# Patient Record
Sex: MALE | Race: WHITE | Employment: OTHER | URBAN - METROPOLITAN AREA
[De-identification: names, ages, dates, MRNs, and addresses within clinical notes are randomized per-mention and may not be internally consistent; named-entity substitution may affect disease eponyms.]

---

## 2019-03-27 ENCOUNTER — HOSPITAL ENCOUNTER (OUTPATIENT)
Dept: INTERVENTIONAL RADIOLOGY/VASCULAR | Age: 84
Discharge: NURSING, REHAB OR LONG-TERM CARE | End: 2019-03-27
Attending: UROLOGY | Admitting: UROLOGY

## 2019-03-27 DIAGNOSIS — R33.9 URINARY RETENTION: ICD-10-CM

## 2019-03-27 RX ORDER — LIDOCAINE HYDROCHLORIDE 10 MG/ML
30 INJECTION, SOLUTION EPIDURAL; INFILTRATION; INTRACAUDAL; PERINEURAL ONCE
Status: DISCONTINUED | OUTPATIENT
Start: 2019-03-27 | End: 2019-03-27 | Stop reason: HOSPADM

## 2019-03-27 RX ORDER — LIDOCAINE HYDROCHLORIDE 20 MG/ML
20 INJECTION, SOLUTION INFILTRATION; PERINEURAL ONCE
Status: DISCONTINUED | OUTPATIENT
Start: 2019-03-27 | End: 2019-03-27

## 2019-03-27 NOTE — PROGRESS NOTES
Patient transferred to IR via stretcher for supra pubic tube placement from [facility] and accompanied by daughter, Paula Curtis. (home # [phone] or cell# [phone])     Initial inquiry reveals patient had some bites of shannon and waffle this am and Xarelto given last evening at 6 pm. This was verified with nurse Jose Schwab at [facility]. Procedure rescheduled for 4/10/19 at 9 am. Spoke with nurse from [facility] and reviewed preprocedure instructions: NPO after midnight on 4/9/19 and hold Xarelto on 4/9/19 if okay with PCP. Also requested patient arrive in hospital gown morning of procedure. Informed daughter of plan.

## 2019-04-10 ENCOUNTER — HOSPITAL ENCOUNTER (OUTPATIENT)
Dept: INTERVENTIONAL RADIOLOGY/VASCULAR | Age: 84
Discharge: HOME | End: 2019-04-10
Attending: UROLOGY | Admitting: RADIOLOGY
Payer: MEDICARE

## 2019-04-10 VITALS
HEART RATE: 80 BPM | DIASTOLIC BLOOD PRESSURE: 71 MMHG | HEIGHT: 74 IN | WEIGHT: 189 LBS | SYSTOLIC BLOOD PRESSURE: 142 MMHG | TEMPERATURE: 97.8 F | BODY MASS INDEX: 24.26 KG/M2 | OXYGEN SATURATION: 94 % | RESPIRATION RATE: 14 BRPM

## 2019-04-10 PROCEDURE — 74011250636 HC RX REV CODE- 250/636: Performed by: RADIOLOGY

## 2019-04-10 PROCEDURE — 77030005518 HC CATH URETH FOL 2W BARD -B

## 2019-04-10 PROCEDURE — 99152 MOD SED SAME PHYS/QHP 5/>YRS: CPT

## 2019-04-10 PROCEDURE — C1769 GUIDE WIRE: HCPCS

## 2019-04-10 PROCEDURE — 74011636320 HC RX REV CODE- 636/320: Performed by: RADIOLOGY

## 2019-04-10 PROCEDURE — C1892 INTRO/SHEATH,FIXED,PEEL-AWAY: HCPCS

## 2019-04-10 PROCEDURE — C1894 INTRO/SHEATH, NON-LASER: HCPCS

## 2019-04-10 PROCEDURE — 77030010545

## 2019-04-10 RX ORDER — LIDOCAINE HYDROCHLORIDE 10 MG/ML
INJECTION, SOLUTION EPIDURAL; INFILTRATION; INTRACAUDAL; PERINEURAL
Status: DISCONTINUED
Start: 2019-04-10 | End: 2019-04-10 | Stop reason: HOSPADM

## 2019-04-10 RX ORDER — LIDOCAINE HYDROCHLORIDE 20 MG/ML
JELLY TOPICAL AS NEEDED
Status: DISCONTINUED | OUTPATIENT
Start: 2019-04-10 | End: 2019-04-10 | Stop reason: HOSPADM

## 2019-04-10 RX ORDER — LEVOFLOXACIN 5 MG/ML
500 INJECTION, SOLUTION INTRAVENOUS
Status: COMPLETED | OUTPATIENT
Start: 2019-04-10 | End: 2019-04-10

## 2019-04-10 RX ORDER — FENTANYL CITRATE 50 UG/ML
25 INJECTION, SOLUTION INTRAMUSCULAR; INTRAVENOUS
Status: DISCONTINUED | OUTPATIENT
Start: 2019-04-10 | End: 2019-04-10

## 2019-04-10 RX ORDER — MIDAZOLAM HYDROCHLORIDE 1 MG/ML
5 INJECTION, SOLUTION INTRAMUSCULAR; INTRAVENOUS
Status: DISCONTINUED | OUTPATIENT
Start: 2019-04-10 | End: 2019-04-10

## 2019-04-10 RX ORDER — SODIUM CHLORIDE 9 MG/ML
25 INJECTION, SOLUTION INTRAVENOUS ONCE
Status: COMPLETED | OUTPATIENT
Start: 2019-04-10 | End: 2019-04-10

## 2019-04-10 RX ORDER — LIDOCAINE HYDROCHLORIDE 10 MG/ML
30 INJECTION, SOLUTION EPIDURAL; INFILTRATION; INTRACAUDAL; PERINEURAL ONCE
Status: COMPLETED | OUTPATIENT
Start: 2019-04-10 | End: 2019-04-10

## 2019-04-10 RX ADMIN — FENTANYL CITRATE 25 MCG: 50 INJECTION, SOLUTION INTRAMUSCULAR; INTRAVENOUS at 12:39

## 2019-04-10 RX ADMIN — MIDAZOLAM HYDROCHLORIDE 0.5 MG: 1 INJECTION, SOLUTION INTRAMUSCULAR; INTRAVENOUS at 12:39

## 2019-04-10 RX ADMIN — FENTANYL CITRATE 12.5 MCG: 50 INJECTION, SOLUTION INTRAMUSCULAR; INTRAVENOUS at 12:27

## 2019-04-10 RX ADMIN — LIDOCAINE HYDROCHLORIDE 12 ML: 10 INJECTION, SOLUTION EPIDURAL; INFILTRATION; INTRACAUDAL; PERINEURAL at 12:37

## 2019-04-10 RX ADMIN — MIDAZOLAM HYDROCHLORIDE 0.5 MG: 1 INJECTION, SOLUTION INTRAMUSCULAR; INTRAVENOUS at 12:26

## 2019-04-10 RX ADMIN — IOPAMIDOL 15 ML: 612 INJECTION, SOLUTION INTRAVENOUS at 12:45

## 2019-04-10 RX ADMIN — FENTANYL CITRATE 12.5 MCG: 50 INJECTION, SOLUTION INTRAMUSCULAR; INTRAVENOUS at 12:20

## 2019-04-10 RX ADMIN — SODIUM CHLORIDE 25 ML/HR: 900 INJECTION, SOLUTION INTRAVENOUS at 11:07

## 2019-04-10 RX ADMIN — LEVOFLOXACIN 500 MG: 5 INJECTION, SOLUTION INTRAVENOUS at 11:50

## 2019-04-10 NOTE — PROGRESS NOTES
Report called to [facility]  [phone] regarding placement of suprapubic catheter and post procedure care. Reviewed medications given, VS, drain site and post orders. Verbalized understanding of same. Awaiting transport. Sister at bedside.

## 2019-04-10 NOTE — DISCHARGE INSTRUCTIONS
Side effects of sedation medications and other medications used today have been reviewed. Notify us of nausea, itching, hives, dizziness, or any unusual symptoms. Should you experience any of these significant changes, please call [phone] between the hours of 7:30 am and 10 pm or [phone] after hours. After hours, ask the  to page the X-ray Technologist, and describe the problem to the technologist.     [address]  Special Procedure/Radiology Department      Radiologist: Radha Romero MD    Date: April 10, 2019    Suprapubic catheter Discharge Instructions    Someone should stay with you for the next 24 hours because you did receive sedation. You may be more unsteady than you usual because of the sedation. Have assistance when getting up. Increase your oral fluid intake for the next 24 hours to flush the contrast from you system. Resume your previous diet and medications you were taking prior to today's visit. Do not drive a vehicle or sign any legal documents for the next 24 hours. Watch for signs of infection:  redness, swelling, drainage, pus, fever, chills, or vomiting please call your urologist right away. Use large drainage bag at night and leg bag during the day. Change the dressing every 48 hours for the first week post insertion and then as directed by urologist.    Keep dressing clean and dry, do not get it wet. The dressing needs to be changed every    You may be sore for the next day or two. You make take Tylenol as directed on the label, for soreness. If you have increasing pain over the next few days, please call your urologist or Dr. Lazara Norman, your primary care physician. Follow up with your urologist, as directed. Please do not hesitate to call our department with any questions or concerns at the number listed above.     Jose L Cortes RN        Copy to daughter, Ann Marie Glass and [facility]

## 2019-04-10 NOTE — PROGRESS NOTES
Suprapubic catheter placed by Dr. Melody Boothe. DDI mid lower abd. Draining pale pink urine with sediment. Drowsy but awakens easily. VSS.

## 2019-04-10 NOTE — PROGRESS NOTES
Rcvd. Via stretcher with [employer] and daughter, Climmie Quails prior to supra pubic catheter placement. Procedure and plan of care explained to patient and daughter. Dr. Adi Campbell at bedside. Xarelto last taken on Monday Monday 4-8-19 per daughter and patient. No labs available. Dr. Joshi Postin aware of same.

## 2019-04-10 NOTE — PROGRESS NOTES
Transport present to return patient to [facility].   Daughter riding with patient and transport team.

## 2019-05-04 ENCOUNTER — HOSPITAL ENCOUNTER (EMERGENCY)
Age: 84
Discharge: HOME | End: 2019-05-04
Attending: EMERGENCY MEDICINE
Payer: MEDICARE

## 2019-05-04 VITALS
OXYGEN SATURATION: 97 % | BODY MASS INDEX: 24.66 KG/M2 | DIASTOLIC BLOOD PRESSURE: 66 MMHG | WEIGHT: 182.1 LBS | HEIGHT: 72 IN | HEART RATE: 83 BPM | TEMPERATURE: 98 F | RESPIRATION RATE: 20 BRPM | SYSTOLIC BLOOD PRESSURE: 135 MMHG

## 2019-05-04 DIAGNOSIS — R33.9 URINARY RETENTION: Primary | ICD-10-CM

## 2019-05-04 DIAGNOSIS — N30.00 ACUTE CYSTITIS WITHOUT HEMATURIA: ICD-10-CM

## 2019-05-04 DIAGNOSIS — E86.0 DEHYDRATION: ICD-10-CM

## 2019-05-04 LAB
ANION GAP SERPL CALC-SCNC: 6 MMOL/L (ref 5–15)
APPEARANCE UR: ABNORMAL
BACTERIA URNS QL MICRO: ABNORMAL /HPF
BASOPHILS # BLD: 0 K/UL (ref 0–0.1)
BASOPHILS NFR BLD: 0 % (ref 0–1)
BILIRUB UR QL: NEGATIVE
BUN SERPL-MCNC: 28 MG/DL (ref 6–20)
BUN/CREAT SERPL: 32 (ref 12–20)
CALCIUM SERPL-MCNC: 9 MG/DL (ref 8.5–10.1)
CHLORIDE SERPL-SCNC: 110 MMOL/L (ref 97–108)
CO2 SERPL-SCNC: 27 MMOL/L (ref 21–32)
COLOR UR: ABNORMAL
COMMENT, HOLDF: NORMAL
CREAT SERPL-MCNC: 0.87 MG/DL (ref 0.7–1.3)
DIFFERENTIAL METHOD BLD: ABNORMAL
EOSINOPHIL # BLD: 0.5 K/UL (ref 0–0.4)
EOSINOPHIL NFR BLD: 4 % (ref 0–7)
EPITH CASTS URNS QL MICRO: ABNORMAL /LPF
ERYTHROCYTE [DISTWIDTH] IN BLOOD BY AUTOMATED COUNT: 17 % (ref 11.5–14.5)
GLUCOSE SERPL-MCNC: 98 MG/DL (ref 65–100)
GLUCOSE UR STRIP.AUTO-MCNC: NEGATIVE MG/DL
GRAM STN SPEC: NORMAL
HCT VFR BLD AUTO: 41 % (ref 36.6–50.3)
HGB BLD-MCNC: 12.8 G/DL (ref 12.1–17)
HGB UR QL STRIP: ABNORMAL
IMM GRANULOCYTES # BLD AUTO: 0.1 K/UL (ref 0–0.04)
IMM GRANULOCYTES NFR BLD AUTO: 1 % (ref 0–0.5)
KETONES UR QL STRIP.AUTO: NEGATIVE MG/DL
LEUKOCYTE ESTERASE UR QL STRIP.AUTO: ABNORMAL
LYMPHOCYTES # BLD: 2.1 K/UL (ref 0.8–3.5)
LYMPHOCYTES NFR BLD: 20 % (ref 12–49)
MCH RBC QN AUTO: 29.4 PG (ref 26–34)
MCHC RBC AUTO-ENTMCNC: 31.2 G/DL (ref 30–36.5)
MCV RBC AUTO: 94.3 FL (ref 80–99)
MONOCYTES # BLD: 0.7 K/UL (ref 0–1)
MONOCYTES NFR BLD: 7 % (ref 5–13)
NEUTS SEG # BLD: 7.3 K/UL (ref 1.8–8)
NEUTS SEG NFR BLD: 68 % (ref 32–75)
NITRITE UR QL STRIP.AUTO: NEGATIVE
NRBC # BLD: 0 K/UL (ref 0–0.01)
NRBC BLD-RTO: 0 PER 100 WBC
PH UR STRIP: 7 [PH] (ref 5–8)
PLATELET # BLD AUTO: 364 K/UL (ref 150–400)
PMV BLD AUTO: 9.4 FL (ref 8.9–12.9)
POTASSIUM SERPL-SCNC: 3.8 MMOL/L (ref 3.5–5.1)
PROT UR STRIP-MCNC: 100 MG/DL
RBC # BLD AUTO: 4.35 M/UL (ref 4.1–5.7)
RBC #/AREA URNS HPF: ABNORMAL /HPF (ref 0–5)
SAMPLES BEING HELD,HOLD: NORMAL
SERVICE CMNT-IMP: NORMAL
SODIUM SERPL-SCNC: 143 MMOL/L (ref 136–145)
SP GR UR REFRACTOMETRY: 1.02 (ref 1–1.03)
UR CULT HOLD, URHOLD: NORMAL
UROBILINOGEN UR QL STRIP.AUTO: 1 EU/DL (ref 0.2–1)
WBC # BLD AUTO: 10.7 K/UL (ref 4.1–11.1)
WBC URNS QL MICRO: >100 /HPF (ref 0–4)

## 2019-05-04 PROCEDURE — 74011250636 HC RX REV CODE- 250/636: Performed by: EMERGENCY MEDICINE

## 2019-05-04 PROCEDURE — 74011250637 HC RX REV CODE- 250/637: Performed by: EMERGENCY MEDICINE

## 2019-05-04 PROCEDURE — 81001 URINALYSIS AUTO W/SCOPE: CPT

## 2019-05-04 PROCEDURE — 74011000258 HC RX REV CODE- 258: Performed by: EMERGENCY MEDICINE

## 2019-05-04 PROCEDURE — 87086 URINE CULTURE/COLONY COUNT: CPT

## 2019-05-04 PROCEDURE — 99285 EMERGENCY DEPT VISIT HI MDM: CPT

## 2019-05-04 PROCEDURE — 77030034849

## 2019-05-04 PROCEDURE — 87205 SMEAR GRAM STAIN: CPT

## 2019-05-04 PROCEDURE — 80048 BASIC METABOLIC PNL TOTAL CA: CPT

## 2019-05-04 PROCEDURE — 87186 SC STD MICRODIL/AGAR DIL: CPT

## 2019-05-04 PROCEDURE — 96365 THER/PROPH/DIAG IV INF INIT: CPT

## 2019-05-04 PROCEDURE — 51705 CHANGE OF BLADDER TUBE: CPT

## 2019-05-04 PROCEDURE — 85025 COMPLETE CBC W/AUTO DIFF WBC: CPT

## 2019-05-04 PROCEDURE — 36415 COLL VENOUS BLD VENIPUNCTURE: CPT

## 2019-05-04 PROCEDURE — 87077 CULTURE AEROBIC IDENTIFY: CPT

## 2019-05-04 PROCEDURE — 96366 THER/PROPH/DIAG IV INF ADDON: CPT

## 2019-05-04 RX ORDER — CEFDINIR 300 MG/1
300 CAPSULE ORAL
Status: COMPLETED | OUTPATIENT
Start: 2019-05-04 | End: 2019-05-04

## 2019-05-04 RX ORDER — CEFDINIR 300 MG/1
300 CAPSULE ORAL 2 TIMES DAILY
Qty: 14 CAP | Refills: 0 | Status: SHIPPED | OUTPATIENT
Start: 2019-05-04 | End: 2019-05-11

## 2019-05-04 RX ADMIN — PIPERACILLIN SODIUM,TAZOBACTAM SODIUM 3.38 G: 3; .375 INJECTION, POWDER, FOR SOLUTION INTRAVENOUS at 09:52

## 2019-05-04 RX ADMIN — CEFDINIR 300 MG: 300 CAPSULE ORAL at 11:54

## 2019-05-04 RX ADMIN — SODIUM CHLORIDE 1000 ML: 900 INJECTION, SOLUTION INTRAVENOUS at 09:50

## 2019-05-04 NOTE — ED TRIAGE NOTES
Triage Note: Patient is coming in from [facility] with a clogged yee and abdominal pain. Patient states that the yee was supposed to be changed 2 days ago but did not change the yee. Patient had low oxygen levels at the home and placed on 2 LNC en route but does not usually wear oxygen.

## 2019-05-04 NOTE — DISCHARGE INSTRUCTIONS
Patient Education   Patient Education        Urinary Tract Infections in Men: Care Instructions  Your Care Instructions    A urinary tract infection, or UTI, is a general term for an infection anywhere between the kidneys and the tip of the penis. UTIs can also be a result of a prostate problem. Most cause pain or burning when you urinate. Most UTIs are caused by bacteria and can be cured with antibiotics. It is important to complete your treatment so that the infection does not get worse. Follow-up care is a key part of your treatment and safety. Be sure to make and go to all appointments, and call your doctor if you are having problems. It's also a good idea to know your test results and keep a list of the medicines you take. How can you care for yourself at home? · Take your antibiotics as prescribed. Do not stop taking them just because you feel better. You need to take the full course of antibiotics. · Take your medicines exactly as prescribed. Your doctor may have prescribed a medicine, such as phenazopyridine (Pyridium), to help relieve pain when you urinate. This turns your urine orange. You may stop taking it when your symptoms get better. But be sure to take all of your antibiotics, which treat the infection. · Drink extra water for the next day or two. This will help make the urine less concentrated and help wash out the bacteria causing the infection. (If you have kidney, heart, or liver disease and have to limit your fluids, talk with your doctor before you increase your fluid intake.)  · Avoid drinks that are carbonated or have caffeine. They can irritate the bladder. · Urinate often. Try to empty your bladder each time. · To relieve pain, take a hot bath or lay a heating pad (set on low) over your lower belly or genital area. Never go to sleep with a heating pad in place. To help prevent UTIs  · Drink plenty of fluids, enough so that your urine is light yellow or clear like water.  If you have kidney, heart, or liver disease and have to limit fluids, talk with your doctor before you increase the amount of fluids you drink. · Urinate when you have the urge. Do not hold your urine for a long time. Urinate before you go to sleep. · Keep your penis clean. Catheter care  If you have a drainage tube (catheter) in place, the following steps will help you care for it. · Always wash your hands before and after touching your catheter. · Check the area around the urethra for inflammation or signs of infection. Signs of infection include irritated, swollen, red, or tender skin, or pus around the catheter. · Clean the area around the catheter with soap and water two times a day. Dry with a clean towel afterward. · Do not apply powder or lotion to the skin around the catheter. To empty the urine collection bag  · Wash your hands with soap and water. · Without touching the drain spout, remove the spout from its sleeve at the bottom of the collection bag. Open the valve on the spout. · Let the urine flow out of the bag and into the toilet or a container. Do not let the tubing or drain spout touch anything. · After you empty the bag, clean the end of the drain spout with tissue and water. Close the valve and put the drain spout back into its sleeve at the bottom of the collection bag. · Wash your hands with soap and water. When should you call for help? Call your doctor now or seek immediate medical care if:    · Symptoms such as a fever, chills, nausea, or vomiting get worse or happen for the first time.     · You have new pain in your back just below your rib cage. This is called flank pain.     · There is new blood or pus in your urine.     · You are not able to take or keep down your antibiotics.    Watch closely for changes in your health, and be sure to contact your doctor if:    · You are not getting better after taking an antibiotic for 2 days.     · Your symptoms go away but then come back.    Where can you learn more? Go to http://jey-crissy.info/. Enter G945 in the search box to learn more about \"Urinary Tract Infections in Men: Care Instructions. \"  Current as of: March 20, 2018  Content Version: 11.9  © 1247-5288 [employer]. Care instructions adapted under license by [employer] (which disclaims liability or warranty for this information). If you have questions about a medical condition or this instruction, always ask your healthcare professional. [address] any warranty or liability for your use of this information. Urinary Retention: Care Instructions  Your Care Instructions    Urinary retention means that you aren't able to urinate. In men, it is often caused by a blockage of the urinary tract from an enlarged prostate gland. In men and women, it can also be caused by an infection or nerve damage. Or it may be a side effect of a medicine. The doctor may have drained the urine from your bladder. If you still have problems passing urine, you may need to use a catheter at home. This is used to empty your bladder until the problem can be fixed. Your doctor may put a catheter in your bladder before you go home. If so, he or she will tell you when to come back to have the catheter removed. The doctor has checked you closely. But problems can develop later. If you notice any problems or new symptoms, get medical treatment right away. Follow-up care is a key part of your treatment and safety. Be sure to make and go to all appointments, and call your doctor if you are having problems. It's also a good idea to know your test results and keep a list of the medicines you take. How can you care for yourself at home? · Take your medicines exactly as prescribed. Call your doctor if you think you are having a problem with your medicine. You will get more details on the specific medicines your doctor prescribes.   · Check with your doctor before you use any over-the-counter medicines. Many cold and allergy medicines, for example, can make this problem worse. Make sure your doctor knows all of the medicines, vitamins, supplements, and herbal remedies you take. · Spread out through the day the amount of fluid you drink. Do not drink a lot at bedtime. · Avoid alcohol and caffeine. · If you have been given a catheter, or if one is already in place, follow the instructions you were given. Always wash your hands before and after you handle the catheter. When should you call for help? Call your doctor now or seek immediate medical care if:    · You cannot urinate at all, or it is getting harder to urinate.     · You have symptoms of a urinary tract infection. These may include:  ? Pain or burning when you urinate. ? A frequent need to urinate without being able to pass much urine. ? Pain in the flank, which is just below the rib cage and above the waist on either side of the back. ? Blood in your urine. ? A fever.    Watch closely for changes in your health, and be sure to contact your doctor if:    · You have any problems with your catheter.     · You do not get better as expected. Where can you learn more? Go to http://jey-crissy.info/. Enter M244 in the search box to learn more about \"Urinary Retention: Care Instructions. \"  Current as of: March 20, 2018  Content Version: 11.9  © 6663-9536 [employer]. Care instructions adapted under license by [employer] (which disclaims liability or warranty for this information). If you have questions about a medical condition or this instruction, always ask your healthcare professional. [place] [address] any warranty or liability for your use of this information. Patient Education        Learning About Urinary Catheter Care to Prevent Infection  What is a urinary catheter?     A urinary catheter is a flexible plastic tube used to drain urine from your bladder when you can't urinate on your own. The catheter allows urine to drain from the bladder into a bag. Two types of drainage bags may be used with a urinary catheter. · A bedside bag is a large bag that you can hang on the side of your bed or on a chair. You can use it overnight or anytime you will be sitting or lying down for a long time. · A leg bag is a small bag that you can use during the day. It is usually attached to your thigh or calf and hidden under your clothes. Having a urinary catheter increases your risk of getting a urinary tract infection. Germs may get on the catheter and cause an infection in your bladder or kidneys. The longer you have a catheter, the more likely it is that you will get an infection. You can help prevent this problem with good hygiene and careful handling of your catheter and drainage bags. How can you help prevent infection? Take care to be clean  · Always wash your hands well before and after you handle your catheter. · Clean the skin around the catheter twice a day using soap and water. Dry with a clean towel afterward. You can shower with your catheter and drainage bag in place unless your doctor told you not to. · When you clean around the catheter, check the surrounding skin for signs of infection. Look for things like pus or irritated, swollen, red, or tender skin around the catheter. Be careful with your drainage bag  · Always keep the drainage bag below the level of your bladder. This will help keep urine from flowing back into your bladder. · Check often to see that urine is flowing through the catheter into the drainage bag. · Empty the drainage bag when it is half full. This will keep it from overflowing or backing up. · When you empty the drainage bag, do not let the tubing or drain spout touch anything. Be careful with your catheter  · Do not unhook the catheter from the drain tube.  That could let germs get into the tube.  · Make sure that the catheter tubing does not get twisted or kinked. · Do not tug or pull on the catheter. And make sure that the drainage bag does not drag or pull on the catheter. · Do not put powder or lotion on the skin around the catheter. · Talk with your doctor about your options for sexual intercourse while wearing a catheter. How do you empty a urine drainage bag? If your doctor has asked you to keep a record, write down the amount of urine in the bag before you empty it. Wash your hands before and after you touch the bag. 1. Remove the drain spout from its sleeve at the bottom of the drainage bag.  2. Open the valve on the drain spout. Let the urine flow out into the toilet or a container. Be careful not to let the tubing or drain spout touch anything. 3. After you empty the bag, close the valve. Then put the drain spout back into its sleeve at the bottom of the collection bag. How do you add a bedside bag to a leg bag? Wash your hands before and after you handle the bags. 1. Empty the leg bag attached to the catheter. 2. Put a clean towel under the leg bag.  3. Use an alcohol wipe to clean the tip of the bedside bag. Then connect the bedside bag to the leg bag. How can you clean a bedside drainage bag? Many people clean their bedside bag in the morning if they switch to a leg bag. To clean a bedside drainage ba. Remove the bedside bag from the leg bag.  2. Fill the bag with 2 parts vinegar and 3 parts water. Let it stand for 20 minutes. 3. Empty the bag, and let it air dry. When should you call for help? Call your doctor now or seek immediate medical care if:  · You have symptoms of a urinary infection. These may include:  ? Pain or burning when you urinate. ? A frequent need to urinate without being able to pass much urine. ? Pain in the flank, which is just below the rib cage and above the waist on either side of the back. ? Blood in your urine. ? A fever.   · Your urine smells bad. · You see large blood clots in your urine. · No urine or very little urine is flowing into the bag for 4 or more hours. Watch closely for changes in your health, and be sure to contact your doctor if:  · The area around the catheter becomes irritated, swollen, red, or tender, or there is pus draining from it. · Urine is leaking from the place where the catheter enters your body. Follow-up care is a key part of your treatment and safety. Be sure to make and go to all appointments, and call your doctor if you are having problems. It's also a good idea to know your test results and keep a list of the medicines you take. Where can you learn more? Go to http://jey-crissy.info/. Enter U010 in the search box to learn more about \"Learning About Urinary Catheter Care to Prevent Infection. \"  Current as of: March 20, 2018  Content Version: 11.9  © 6973-1186 [employer]. Care instructions adapted under license by [employer] (which disclaims liability or warranty for this information). If you have questions about a medical condition or this instruction, always ask your healthcare professional. [place] [address] any warranty or liability for your use of this information.

## 2019-05-04 NOTE — ED PROVIDER NOTES
80 y.o. male with past medical history significant for HTN, TIA, AFib, and urinary retention who presents from [facility] via EMS with chief complaint of abdominal pain. Patient has a h/o dementia, and is a difficult historian. EMS reports the patient was due for a yee change a few days ago, but his yee has not been changed. EMS states the patient started complaining last night of abdominal pain and \"tightness\", staff noted his yee stopped draining last night. EMS also notes the patient's RA sats were 83% on arrival, and they placed the patient on O2 via NC. Per paperwork, the patient is a full code. He has a h/o HTN, AFib, PTSD, and dementia. Patient currently denies any chest pain or SOB. There are no other acute medical concerns at this time. Full history, physical exam, and ROS unable to be obtained due to:  dementia. Social hx: Nonsmoker; No EtOH use Note written by Leopoldo Southward, Scribe, as dictated by Pablo Shah DO 8:00 AM 
 
 
 
Old chart reviewed - had an SPC placed by IR April 10. 16F [place] tip The history is provided by the patient, medical records and the EMS personnel. No  was used. Past Medical History:  
Diagnosis Date  A-fib ([address]  Hypertension  Spondylosis  Thyroid disease  TIA (transient ischemic attack)  Urinary retention Past Surgical History:  
Procedure Laterality Date [address] A  HX SPLENECTOMY  IR ASP BLADDER SUPRA CATH  4/10/2019 History reviewed. No pertinent family history. Social History Socioeconomic History  Marital status:  Spouse name: Not on file  Number of children: Not on file  Years of education: Not on file  Highest education level: Not on file Occupational History  Not on file Social Needs  Financial resource strain: Not on file  Food insecurity:  
  Worry: Not on file Inability: Not on file  Transportation needs:  
  Medical: Not on file Non-medical: Not on file Tobacco Use  Smoking status: Never Smoker  Smokeless tobacco: Never Used Substance and Sexual Activity  Alcohol use: No  
  Frequency: Never  Drug use: Not on file  Sexual activity: Not on file Lifestyle  Physical activity:  
  Days per week: Not on file Minutes per session: Not on file  Stress: Not on file Relationships  Social connections:  
  Talks on phone: Not on file Gets together: Not on file Attends Sikhism service: Not on file Active member of club or organization: Not on file Attends meetings of clubs or organizations: Not on file Relationship status: Not on file  Intimate partner violence:  
  Fear of current or ex partner: Not on file Emotionally abused: Not on file Physically abused: Not on file Forced sexual activity: Not on file Other Topics Concern  Not on file Social History Narrative  Not on file ALLERGIES: Patient has no known allergies. Review of Systems Unable to perform ROS: Dementia Vitals:  
 05/04/19 0759 BP: 135/70 Pulse: 83 Resp: 20 Temp: 98 °F (36.7 °C) SpO2: 96% Weight: 82.6 kg (182 lb 1.6 oz) Height: 6' (1.829 m) Physical Exam  
  
Constitutional: Pt is awake and alert. NAD. Frail and elderly. HENT:  
Head: Normocephalic and atraumatic. Nose: Nose normal.  
Mouth/Throat: Oropharynx is clear. No oropharyngeal exudate. Dry MM. Eyes: Conjunctivae and extraocular motions are normal. Pupils are equal, round, and reactive to light. Right eye exhibits no discharge. Left eye exhibits no discharge. No scleral icterus. Neck: No tracheal deviation present. Supple neck. Cardiovascular: Normal rate, irregular rhythm, normal heart sounds and intact distal pulses. Exam reveals no gallop and no friction rub. No murmur heard. Pulmonary/Chest: Effort normal and breath sounds normal.  Pt  has no wheezes. Pt  has no rales. No respiratory distress. Abdominal: Soft. Pt  exhibits no mass. Pt  has no rebound and no guarding. Suprapubic cath in place. Fullness and tenderness to suprapubic region. Can palpate the bladder. Musculoskeletal:  Pt  exhibits no edema and no tenderness. Ext: Normal ROM in all four extremities; not tender to palpation; distal pulses are normal, no edema. Right AKA. Neurological:  Pt is alert. nonfocal neuro exam. 
Skin: Skin is warm and dry. Pt  is not diaphoretic. Psychiatric:  Pt  has a normal mood and affect. Behavior is normal.  
 
Note written by Christiano Parks, as dictated by Jenae Perez DO 8:00 AM 
 
[facility] Procedures Procedure - suprapubic catheter change. # 1  Took out old SPC. Urine came out. Prepped with betadine. Passed regular 16F yee. Immediately got thick purulent urine in the catheter. This had to be irrigated. Ultimately over a L of urine came out. Tolerated well  Kori Beltran DO 
 
 
Procedure note - SPC change # 2 - changed from old [address] yee here and place a [address] yee like what was originally placed last month. Tolerated well  Kori Beltran DO  10:11 AM 
 
 
  
 
Recent Results (from the past 12 hour(s)) CBC WITH AUTOMATED DIFF Collection Time: 05/04/19  8:35 AM  
Result Value Ref Range WBC 10.7 4.1 - 11.1 K/uL  
 RBC 4.35 4.10 - 5.70 M/uL  
 HGB 12.8 12.1 - 17.0 g/dL HCT 41.0 36.6 - 50.3 % MCV 94.3 80.0 - 99.0 FL  
 MCH 29.4 26.0 - 34.0 PG  
 MCHC 31.2 30.0 - 36.5 g/dL  
 RDW 17.0 (H) 11.5 - 14.5 % PLATELET 277 807 - 204 K/uL MPV 9.4 8.9 - 12.9 FL  
 NRBC 0.0 0  WBC ABSOLUTE NRBC 0.00 0.00 - 0.01 K/uL NEUTROPHILS 68 32 - 75 % LYMPHOCYTES 20 12 - 49 % MONOCYTES 7 5 - 13 % EOSINOPHILS 4 0 - 7 % BASOPHILS 0 0 - 1 % IMMATURE GRANULOCYTES 1 (H) 0.0 - 0.5 % ABS. NEUTROPHILS 7.3 1.8 - 8.0 K/UL  
 ABS. LYMPHOCYTES 2.1 0.8 - 3.5 K/UL  
 ABS. MONOCYTES 0.7 0.0 - 1.0 K/UL  
 ABS. EOSINOPHILS 0.5 (H) 0.0 - 0.4 K/UL  
 ABS. BASOPHILS 0.0 0.0 - 0.1 K/UL  
 ABS. IMM. GRANS. 0.1 (H) 0.00 - 0.04 K/UL  
 DF AUTOMATED METABOLIC PANEL, BASIC Collection Time: 05/04/19  8:35 AM  
Result Value Ref Range Sodium 143 136 - 145 mmol/L Potassium 3.8 3.5 - 5.1 mmol/L Chloride 110 (H) 97 - 108 mmol/L  
 CO2 27 21 - 32 mmol/L Anion gap 6 5 - 15 mmol/L Glucose 98 65 - 100 mg/dL BUN 28 (H) 6 - 20 MG/DL Creatinine 0.87 0.70 - 1.30 MG/DL  
 BUN/Creatinine ratio 32 (H) 12 - 20 GFR est AA >60 >60 ml/min/1.73m2 GFR est non-AA >60 >60 ml/min/1.73m2 Calcium 9.0 8.5 - 10.1 MG/DL  
SAMPLES BEING HELD Collection Time: 05/04/19  8:35 AM  
Result Value Ref Range SAMPLES BEING HELD 1RED 1BLU   
 COMMENT Add-on orders for these samples will be processed based on acceptable specimen integrity and analyte stability, which may vary by analyte. URINALYSIS W/MICROSCOPIC Collection Time: 05/04/19  8:35 AM  
Result Value Ref Range Color DARK YELLOW Appearance TURBID (A) CLEAR Specific gravity 1.017 1.003 - 1.030    
 pH (UA) 7.0 5.0 - 8.0 Protein 100 (A) NEG mg/dL Glucose NEGATIVE  NEG mg/dL Ketone NEGATIVE  NEG mg/dL Bilirubin NEGATIVE  NEG Blood LARGE (A) NEG Urobilinogen 1.0 0.2 - 1.0 EU/dL Nitrites NEGATIVE  NEG Leukocyte Esterase LARGE (A) NEG    
 WBC >100 (H) 0 - 4 /hpf  
 RBC 0-5 0 - 5 /hpf Epithelial cells FEW FEW /lpf Bacteria 4+ (A) NEG /hpf URINE CULTURE HOLD SAMPLE Collection Time: 05/04/19  8:35 AM  
Result Value Ref Range Urine culture hold URINE ON HOLD IN MICROBIOLOGY DEPT FOR 3 DAYS. IF UNPRESERVED URINE IS SUBMITTED, IT CANNOT BE USED FOR ADDITIONAL TESTING AFTER 24 HRS, RECOLLECTION WILL BE REQUIRED. GRAM STAIN  Collection Time: 05/04/19  8:49 AM  
 Result Value Ref Range Special Requests: PLEASE GRAM STAIN URINE   
 GRAM STAIN OCCASIONAL WBCS SEEN    
 GRAM STAIN 4+ GRAM POSITIVE COCCI IN PAIRS AND CLUSTERS    
 GRAM STAIN 3+ GRAM NEGATIVE RODS    
 
 
10:26 AM-  Making urine. Ivf. Iv zosyn. Urine cx. No hypotension. No fever. A little dry on exam.  
 
11:13 AM 
Lance is draining well. BP reassuring. Patient has been here 3.5 hours without hypotension. Will discharge back to his facility. 1 week of onmnicef 
cx pending

## 2019-05-07 LAB
BACTERIA SPEC CULT: ABNORMAL
CC UR VC: ABNORMAL
SERVICE CMNT-IMP: ABNORMAL

## 2019-05-08 NOTE — PROGRESS NOTES
I spoke with patients nurse at [facility].  Result faxed via [facility] to [phone]

## 2019-05-14 ENCOUNTER — HOSPITAL ENCOUNTER (EMERGENCY)
Age: 84
Discharge: NURSING, REHAB OR LONG-TERM CARE | End: 2019-05-14
Attending: STUDENT IN AN ORGANIZED HEALTH CARE EDUCATION/TRAINING PROGRAM | Admitting: STUDENT IN AN ORGANIZED HEALTH CARE EDUCATION/TRAINING PROGRAM
Payer: MEDICARE

## 2019-05-14 ENCOUNTER — APPOINTMENT (OUTPATIENT)
Dept: CT IMAGING | Age: 84
End: 2019-05-14
Attending: EMERGENCY MEDICINE
Payer: MEDICARE

## 2019-05-14 ENCOUNTER — APPOINTMENT (OUTPATIENT)
Dept: GENERAL RADIOLOGY | Age: 84
End: 2019-05-14
Attending: STUDENT IN AN ORGANIZED HEALTH CARE EDUCATION/TRAINING PROGRAM
Payer: MEDICARE

## 2019-05-14 VITALS
TEMPERATURE: 98 F | OXYGEN SATURATION: 98 % | HEART RATE: 77 BPM | DIASTOLIC BLOOD PRESSURE: 71 MMHG | RESPIRATION RATE: 17 BRPM | SYSTOLIC BLOOD PRESSURE: 148 MMHG
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
## 2019-05-14 DIAGNOSIS — S01.01XA LACERATION OF SCALP WITHOUT FOREIGN BODY, INITIAL ENCOUNTER: Primary | ICD-10-CM

## 2019-05-14 LAB
ALBUMIN SERPL-MCNC: 3 G/DL (ref 3.5–5)
ALBUMIN/GLOB SERPL: 0.7 {RATIO} (ref 1.1–2.2)
ALP SERPL-CCNC: 79 U/L (ref 45–117)
ALT SERPL-CCNC: 15 U/L (ref 12–78)
ANION GAP SERPL CALC-SCNC: 5 MMOL/L (ref 5–15)
APPEARANCE UR: ABNORMAL
AST SERPL-CCNC: 15 U/L (ref 15–37)
BACTERIA URNS QL MICRO: NEGATIVE /HPF
BASOPHILS # BLD: 0 K/UL (ref 0–0.1)
BASOPHILS NFR BLD: 0 % (ref 0–1)
BILIRUB SERPL-MCNC: 0.6 MG/DL (ref 0.2–1)
BILIRUB UR QL CFM: NEGATIVE
BUN SERPL-MCNC: 20 MG/DL (ref 6–20)
BUN/CREAT SERPL: 22 (ref 12–20)
CALCIUM SERPL-MCNC: 9 MG/DL (ref 8.5–10.1)
CAOX CRY URNS QL MICRO: ABNORMAL
CHLORIDE SERPL-SCNC: 109 MMOL/L (ref 97–108)
CO2 SERPL-SCNC: 29 MMOL/L (ref 21–32)
COLOR UR: ABNORMAL
COMMENT, HOLDF: NORMAL
CREAT SERPL-MCNC: 0.9 MG/DL (ref 0.7–1.3)
DIFFERENTIAL METHOD BLD: ABNORMAL
EOSINOPHIL # BLD: 0.4 K/UL (ref 0–0.4)
EOSINOPHIL NFR BLD: 4 % (ref 0–7)
EPITH CASTS URNS QL MICRO: ABNORMAL /LPF
ERYTHROCYTE [DISTWIDTH] IN BLOOD BY AUTOMATED COUNT: 17.7 % (ref 11.5–14.5)
GLOBULIN SER CALC-MCNC: 4.1 G/DL (ref 2–4)
GLUCOSE SERPL-MCNC: 103 MG/DL (ref 65–100)
GLUCOSE UR STRIP.AUTO-MCNC: NEGATIVE MG/DL
HCT VFR BLD AUTO: 39.9 % (ref 36.6–50.3)
HGB BLD-MCNC: 12.9 G/DL (ref 12.1–17)
HGB UR QL STRIP: ABNORMAL
IMM GRANULOCYTES # BLD AUTO: 0 K/UL (ref 0–0.04)
IMM GRANULOCYTES NFR BLD AUTO: 0 % (ref 0–0.5)
KETONES UR QL STRIP.AUTO: ABNORMAL MG/DL
LEUKOCYTE ESTERASE UR QL STRIP.AUTO: ABNORMAL
LYMPHOCYTES # BLD: 1.9 K/UL (ref 0.8–3.5)
LYMPHOCYTES NFR BLD: 19 % (ref 12–49)
MCH RBC QN AUTO: 30.1 PG (ref 26–34)
MCHC RBC AUTO-ENTMCNC: 32.3 G/DL (ref 30–36.5)
MCV RBC AUTO: 93 FL (ref 80–99)
MONOCYTES # BLD: 0.7 K/UL (ref 0–1)
MONOCYTES NFR BLD: 7 % (ref 5–13)
NEUTS SEG # BLD: 7 K/UL (ref 1.8–8)
NEUTS SEG NFR BLD: 70 % (ref 32–75)
NITRITE UR QL STRIP.AUTO: NEGATIVE
NRBC # BLD: 0 K/UL (ref 0–0.01)
NRBC BLD-RTO: 0 PER 100 WBC
PH UR STRIP: 5.5 [PH] (ref 5–8)
PLATELET # BLD AUTO: 391 K/UL (ref 150–400)
PMV BLD AUTO: 9.7 FL (ref 8.9–12.9)
POTASSIUM SERPL-SCNC: 3.7 MMOL/L (ref 3.5–5.1)
PROT SERPL-MCNC: 7.1 G/DL (ref 6.4–8.2)
PROT UR STRIP-MCNC: 300 MG/DL
RBC # BLD AUTO: 4.29 M/UL (ref 4.1–5.7)
RBC #/AREA URNS HPF: >100 /HPF (ref 0–5)
SAMPLES BEING HELD,HOLD: NORMAL
SODIUM SERPL-SCNC: 143 MMOL/L (ref 136–145)
SP GR UR REFRACTOMETRY: 1.01 (ref 1–1.03)
UR CULT HOLD, URHOLD: NORMAL
UROBILINOGEN UR QL STRIP.AUTO: 1 EU/DL (ref 0.2–1)
WBC # BLD AUTO: 10 K/UL (ref 4.1–11.1)
WBC URNS QL MICRO: ABNORMAL /HPF (ref 0–4)

## 2019-05-14 PROCEDURE — 81001 URINALYSIS AUTO W/SCOPE: CPT

## 2019-05-14 PROCEDURE — 99285 EMERGENCY DEPT VISIT HI MDM: CPT

## 2019-05-14 PROCEDURE — 77030008467 HC STPLR SKN COVD -B

## 2019-05-14 PROCEDURE — 71045 X-RAY EXAM CHEST 1 VIEW: CPT

## 2019-05-14 PROCEDURE — 85025 COMPLETE CBC W/AUTO DIFF WBC: CPT

## 2019-05-14 PROCEDURE — 80053 COMPREHEN METABOLIC PANEL: CPT

## 2019-05-14 PROCEDURE — 70450 CT HEAD/BRAIN W/O DYE: CPT

## 2019-05-14 PROCEDURE — 36415 COLL VENOUS BLD VENIPUNCTURE: CPT

## 2019-05-14 PROCEDURE — 74011250636 HC RX REV CODE- 250/636: Performed by: STUDENT IN AN ORGANIZED HEALTH CARE EDUCATION/TRAINING PROGRAM

## 2019-05-14 RX ORDER — LIDOCAINE HYDROCHLORIDE 10 MG/ML
3 INJECTION, SOLUTION EPIDURAL; INFILTRATION; INTRACAUDAL; PERINEURAL ONCE
Status: COMPLETED | OUTPATIENT
Start: 2019-05-14 | End: 2019-05-14

## 2019-05-14 RX ORDER — LIDOCAINE HYDROCHLORIDE 10 MG/ML
3 INJECTION INFILTRATION; PERINEURAL ONCE
Status: DISCONTINUED | OUTPATIENT
Start: 2019-05-14 | End: 2019-05-14 | Stop reason: SDUPTHER

## 2019-05-14 RX ADMIN — LIDOCAINE HYDROCHLORIDE 3 ML: 10 INJECTION, SOLUTION EPIDURAL; INFILTRATION; INTRACAUDAL; PERINEURAL at 18:18

## 2019-05-14 NOTE — ED PROVIDER NOTES
80 y.o. male with past medical history significant for HTN, Spondylosis, TIA, A-fib, Thyroid disease, and Urinary retention who presents from [facility] via EMS s/p GLF for further evaluation. Pt's daughter reports nursing home informed her that pt \"fell out of bed last night\" and \"fell out of his wheelchair this morning\". Pt's daughter reports pt is not at baseline mentation stating, Belen William is not usually disoriented\". Pt's daughter reports pt has had multiple UTIs recently that \"cause him to be confused\". Pt's daughter reports nursing home sent UA out \"last night\" and that they are still awaiting results. Pt's daughter reports pt had suprapubic catheter changed \"a few weeks ago\" and notes there has been \"blood\" in yee bag since \"pt fell\". Pt's daughter reports pt is on Xarelto for A-fib. There are no other acute medical concerns at this time. Full history, physical exam, and ROS unable to be obtained due to:  AMS. Old Chart Review: Pt was last seen in ED on 5/4/19 for urinary retention. Pt was diagnosed with acute cystitis w/o hematuria. Pt was given 2L IV fluids, IV zosyn, and 300 mg omnicef. Pt also had suprapubic catheter changed. Pt was discharged home and recommended to f/u with Urology. Pt had suprapubic catheter placed on 4/10/19 by IR. Social hx: Non smoker; No EtOH use Note written by Christiano Trivedi, as dictated by Chauncy Prader, MD 5:00 PM 
 
 
The history is provided by medical records and a relative (daughter). No  was used. Past Medical History:  
Diagnosis Date  A-fib ([address]  Hypertension  Spondylosis  Thyroid disease  TIA (transient ischemic attack)  Urinary retention Past Surgical History:  
Procedure Laterality Date [address] A  HX SPLENECTOMY  IR ASP BLADDER SUPRA CATH  4/10/2019 History reviewed. No pertinent family history. Social History Socioeconomic History  Marital status:  Spouse name: Not on file  Number of children: Not on file  Years of education: Not on file  Highest education level: Not on file Occupational History  Not on file Social Needs  Financial resource strain: Not on file  Food insecurity:  
  Worry: Not on file Inability: Not on file  Transportation needs:  
  Medical: Not on file Non-medical: Not on file Tobacco Use  Smoking status: Never Smoker  Smokeless tobacco: Never Used Substance and Sexual Activity  Alcohol use: No  
  Frequency: Never  Drug use: Not on file  Sexual activity: Not on file Lifestyle  Physical activity:  
  Days per week: Not on file Minutes per session: Not on file  Stress: Not on file Relationships  Social connections:  
  Talks on phone: Not on file Gets together: Not on file Attends Confucianism service: Not on file Active member of club or organization: Not on file Attends meetings of clubs or organizations: Not on file Relationship status: Not on file  Intimate partner violence:  
  Fear of current or ex partner: Not on file Emotionally abused: Not on file Physically abused: Not on file Forced sexual activity: Not on file Other Topics Concern  Not on file Social History Narrative  Not on file ALLERGIES: Codeine Review of Systems Unable to perform ROS: Mental status change Vitals:  
 05/14/19 1559 BP: 123/62 Pulse: 96  
Resp: 16 Temp: 97.6 °F (36.4 °C) SpO2: 94% Physical Exam  
Constitutional: He appears well-developed and well-nourished. No distress. Pt is frail and elderly appearing. HENT:  
Head: Normocephalic. Head is with contusion and with laceration. Nose: Nose normal.  
Mouth/Throat: Oropharynx is clear and moist. No oropharyngeal exudate. Pt has hematoma to front scalp midline with 3 cm laceration that is hemostatic. Eyes: Conjunctivae and EOM are normal. Right eye exhibits no discharge. Left eye exhibits no discharge. No scleral icterus. Neck: Normal range of motion. Neck supple. No JVD present. No tracheal deviation present. No thyromegaly present. Cardiovascular: Normal rate, regular rhythm, normal heart sounds and intact distal pulses. Exam reveals no gallop and no friction rub. No murmur heard. Pulmonary/Chest: Effort normal and breath sounds normal. No stridor. No respiratory distress. He has no wheezes. He has no rales. He exhibits no tenderness. Abdominal: Bowel sounds are normal. He exhibits no distension and no mass. There is no tenderness. There is no rebound. Genitourinary:  
Genitourinary Comments: Pt has chronic indwelling suprapubic catheter and blood tinged urine in yee bag. Musculoskeletal: He exhibits no edema or tenderness. Pt has right above the knee amputation. Lymphadenopathy:  
  He has no cervical adenopathy. Neurological: He is alert. No cranial nerve deficit. Coordination normal.  
Skin: Skin is warm and dry. No rash noted. He is not diaphoretic. No erythema. No pallor. Psychiatric: He has a normal mood and affect. His behavior is normal. Judgment and thought content normal.  
Nursing note and vitals reviewed. Note written by Christiano Hannon, as dictated by Alexis Carrera MD 5:00 PM 
 
MDM Number of Diagnoses or Management Options Fall, initial encounter:  
Laceration of scalp without foreign body, initial encounter:  
Diagnosis management comments: A/P: Intercranial hemorrhage, head laceration, UTI. 5year-old male history of dementia sustained a ground-level fall prior to arrival striking his for tinnitus laceration. Patient recently been treated for UTI as a suprapubic catheter placed recently by IR.  There appears to be some confusion of patient's baseline mental status as the patient's facility states that this is his baseline where the daughter states that this is not his baseline. CT head without contrast, CBC, CMP, UA. Irrigate suprapubic Lance. Amount and/or Complexity of Data Reviewed Clinical lab tests: ordered and reviewed Tests in the radiology section of CPT®: reviewed and ordered Review and summarize past medical records: yes Discuss the patient with other providers: yes Independent visualization of images, tracings, or specimens: yes Risk of Complications, Morbidity, and/or Mortality Presenting problems: moderate Diagnostic procedures: moderate Management options: moderate Patient Progress Patient progress: stable Procedures 7:05 PM 
Patient's results have been reviewed with them. Patient and/or family have verbally conveyed their understanding and agreement of the patient's signs, symptoms, diagnosis, treatment and prognosis and additionally agree to follow up as recommended or return to the Emergency Room should their condition change prior to follow-up. Discharge instructions have also been provided to the patient with some educational information regarding their diagnosis as well a list of reasons why they would want to return to the ER prior to their follow-up appointment should their condition change.

## 2019-05-14 NOTE — DISCHARGE INSTRUCTIONS
Patient Education        Preventing Falls: Care Instructions  Your Care Instructions    Getting around your home safely can be a challenge if you have injuries or health problems that make it easy for you to fall. Loose rugs and furniture in walkways are among the dangers for many older people who have problems walking or who have poor eyesight. People who have conditions such as arthritis, osteoporosis, or dementia also have to be careful not to fall. You can make your home safer with a few simple measures. Follow-up care is a key part of your treatment and safety. Be sure to make and go to all appointments, and call your doctor if you are having problems. It's also a good idea to know your test results and keep a list of the medicines you take. How can you care for yourself at home? Taking care of yourself  · You may get dizzy if you do not drink enough water. To prevent dehydration, drink plenty of fluids, enough so that your urine is light yellow or clear like water. Choose water and other caffeine-free clear liquids. If you have kidney, heart, or liver disease and have to limit fluids, talk with your doctor before you increase the amount of fluids you drink. · Exercise regularly to improve your strength, muscle tone, and balance. Walk if you can. Swimming may be a good choice if you cannot walk easily. · Have your vision and hearing checked each year or any time you notice a change. If you have trouble seeing and hearing, you might not be able to avoid objects and could lose your balance. · Know the side effects of the medicines you take. Ask your doctor or pharmacist whether the medicines you take can affect your balance. Sleeping pills or sedatives can affect your balance. · Limit the amount of alcohol you drink. Alcohol can impair your balance and other senses. · Ask your doctor whether calluses or corns on your feet need to be removed.  If you wear loose-fitting shoes because of calluses or corns, you can lose your balance and fall. · Talk to your doctor if you have numbness in your feet. Preventing falls at home  · Remove raised doorway thresholds, throw rugs, and clutter. Repair loose carpet or raised areas in the floor. · Move furniture and electrical cords to keep them out of walking paths. · Use nonskid floor wax, and wipe up spills right away, especially on ceramic tile floors. · If you use a walker or cane, put rubber tips on it. If you use crutches, clean the bottoms of them regularly with an abrasive pad, such as steel wool. · Keep your house well lit, especially [address], and outside walkways. Use night-lights in areas such as hallways and bathrooms. Add extra light switches or use remote switches (such as switches that go on or off when you clap your hands) to make it easier to turn lights on if you have to get up during the night. · Install sturdy handrails on stairways. · Move items in your cabinets so that the things you use a lot are on the lower shelves (about waist level). · Keep a cordless phone and a flashlight with new batteries by your bed. If possible, put a phone in each of the main rooms of your house, or carry a cell phone in case you fall and cannot reach a phone. Or, you can wear a device around your neck or wrist. You push a button that sends a signal for help. · Wear low-heeled shoes that fit well and give your feet good support. Use footwear with nonskid soles. Check the heels and soles of your shoes for wear. Repair or replace worn heels or soles. · Do not wear socks without shoes on wood floors. · Walk on the grass when the sidewalks are slippery. If you live in an area that gets snow and ice in the winter, sprinkle salt on slippery steps and sidewalks. Preventing falls in the bath  · Install grab bars and nonskid mats inside and outside your shower or tub and near the toilet and sinks. · Use shower chairs and bath benches.   · Use a hand-held shower head that will allow you to sit while showering. · Get into a tub or shower by putting the weaker leg in first. Get out of a tub or shower with your strong side first.  · Repair loose toilet seats and consider installing a raised toilet seat to make getting on and off the toilet easier. · Keep your bathroom door unlocked while you are in the shower. Where can you learn more? Go to http://jey-crissy.info/. Enter [id number] [id number] [phone] in the search box to learn more about \"Preventing Falls: Care Instructions. \"  Current as of: March 16, 2018  Content Version: 11.8  © 2190-8886 [employer]wise, [employer]. Care instructions adapted under license by [employer] (which disclaims liability or warranty for this information). If you have questions about a medical condition or this instruction, always ask your healthcare professional. [address] any warranty or liability for your use of this information.

## 2019-05-22 ENCOUNTER — HOSPITAL ENCOUNTER (EMERGENCY)
Age: 84
Discharge: NURSING, REHAB OR LONG-TERM CARE | End: 2019-05-23
Attending: EMERGENCY MEDICINE | Admitting: EMERGENCY MEDICINE
Payer: MEDICARE

## 2019-05-22 VITALS
BODY MASS INDEX: 24.68 KG/M2 | TEMPERATURE: 98.3 F | OXYGEN SATURATION: 95 % | RESPIRATION RATE: 16 BRPM | DIASTOLIC BLOOD PRESSURE: 63 MMHG | WEIGHT: 182 LBS | HEART RATE: 73 BPM | SYSTOLIC BLOOD PRESSURE: 114 MMHG

## 2019-05-22 DIAGNOSIS — R33.9 URINARY RETENTION: Primary | ICD-10-CM

## 2019-05-22 LAB
ALBUMIN SERPL-MCNC: 2.8 G/DL (ref 3.5–5)
ALBUMIN/GLOB SERPL: 0.7 {RATIO} (ref 1.1–2.2)
ALP SERPL-CCNC: 74 U/L (ref 45–117)
ALT SERPL-CCNC: 12 U/L (ref 12–78)
AMORPH CRY URNS QL MICRO: ABNORMAL
ANION GAP SERPL CALC-SCNC: 6 MMOL/L (ref 5–15)
APPEARANCE UR: ABNORMAL
AST SERPL-CCNC: 12 U/L (ref 15–37)
BACTERIA URNS QL MICRO: ABNORMAL /HPF
BASOPHILS # BLD: 0 K/UL (ref 0–0.1)
BASOPHILS NFR BLD: 0 % (ref 0–1)
BILIRUB SERPL-MCNC: 1.4 MG/DL (ref 0.2–1)
BILIRUB UR QL CFM: ABNORMAL
BUN SERPL-MCNC: 30 MG/DL (ref 6–20)
BUN/CREAT SERPL: 33 (ref 12–20)
CALCIUM SERPL-MCNC: 9 MG/DL (ref 8.5–10.1)
CHLORIDE SERPL-SCNC: 110 MMOL/L (ref 97–108)
CO2 SERPL-SCNC: 25 MMOL/L (ref 21–32)
COLOR UR: ABNORMAL
COMMENT, HOLDF: NORMAL
CREAT SERPL-MCNC: 0.92 MG/DL (ref 0.7–1.3)
DIFFERENTIAL METHOD BLD: ABNORMAL
EOSINOPHIL # BLD: 0.3 K/UL (ref 0–0.4)
EOSINOPHIL NFR BLD: 2 % (ref 0–7)
EPITH CASTS URNS QL MICRO: ABNORMAL /LPF
ERYTHROCYTE [DISTWIDTH] IN BLOOD BY AUTOMATED COUNT: 18.3 % (ref 11.5–14.5)
GLOBULIN SER CALC-MCNC: 4.2 G/DL (ref 2–4)
GLUCOSE SERPL-MCNC: 113 MG/DL (ref 65–100)
GLUCOSE UR STRIP.AUTO-MCNC: NEGATIVE MG/DL
HCT VFR BLD AUTO: 42.6 % (ref 36.6–50.3)
HGB BLD-MCNC: 13.8 G/DL (ref 12.1–17)
HGB UR QL STRIP: ABNORMAL
IMM GRANULOCYTES # BLD AUTO: 0 K/UL (ref 0–0.04)
IMM GRANULOCYTES NFR BLD AUTO: 0 % (ref 0–0.5)
KETONES UR QL STRIP.AUTO: 15 MG/DL
LEUKOCYTE ESTERASE UR QL STRIP.AUTO: ABNORMAL
LYMPHOCYTES # BLD: 1.8 K/UL (ref 0.8–3.5)
LYMPHOCYTES NFR BLD: 17 % (ref 12–49)
MCH RBC QN AUTO: 30 PG (ref 26–34)
MCHC RBC AUTO-ENTMCNC: 32.4 G/DL (ref 30–36.5)
MCV RBC AUTO: 92.6 FL (ref 80–99)
MONOCYTES # BLD: 0.7 K/UL (ref 0–1)
MONOCYTES NFR BLD: 7 % (ref 5–13)
NEUTS SEG # BLD: 8 K/UL (ref 1.8–8)
NEUTS SEG NFR BLD: 74 % (ref 32–75)
NITRITE UR QL STRIP.AUTO: NEGATIVE
NRBC # BLD: 0 K/UL (ref 0–0.01)
NRBC BLD-RTO: 0 PER 100 WBC
PH UR STRIP: 8 [PH] (ref 5–8)
PLATELET # BLD AUTO: 336 K/UL (ref 150–400)
PMV BLD AUTO: 9.7 FL (ref 8.9–12.9)
POTASSIUM SERPL-SCNC: 3.3 MMOL/L (ref 3.5–5.1)
PROT SERPL-MCNC: 7 G/DL (ref 6.4–8.2)
PROT UR STRIP-MCNC: 100 MG/DL
RBC # BLD AUTO: 4.6 M/UL (ref 4.1–5.7)
RBC #/AREA URNS HPF: ABNORMAL /HPF (ref 0–5)
SAMPLES BEING HELD,HOLD: NORMAL
SODIUM SERPL-SCNC: 141 MMOL/L (ref 136–145)
SP GR UR REFRACTOMETRY: 1.02 (ref 1–1.03)
UR CULT HOLD, URHOLD: NORMAL
UROBILINOGEN UR QL STRIP.AUTO: 1 EU/DL (ref 0.2–1)
WBC # BLD AUTO: 10.8 K/UL (ref 4.1–11.1)
WBC URNS QL MICRO: >100 /HPF (ref 0–4)

## 2019-05-22 PROCEDURE — 87077 CULTURE AEROBIC IDENTIFY: CPT

## 2019-05-22 PROCEDURE — 85025 COMPLETE CBC W/AUTO DIFF WBC: CPT

## 2019-05-22 PROCEDURE — 81001 URINALYSIS AUTO W/SCOPE: CPT

## 2019-05-22 PROCEDURE — 99285 EMERGENCY DEPT VISIT HI MDM: CPT

## 2019-05-22 PROCEDURE — 36415 COLL VENOUS BLD VENIPUNCTURE: CPT

## 2019-05-22 PROCEDURE — 74011250636 HC RX REV CODE- 250/636: Performed by: EMERGENCY MEDICINE

## 2019-05-22 PROCEDURE — 51702 INSERT TEMP BLADDER CATH: CPT

## 2019-05-22 PROCEDURE — 96360 HYDRATION IV INFUSION INIT: CPT

## 2019-05-22 PROCEDURE — 87186 SC STD MICRODIL/AGAR DIL: CPT

## 2019-05-22 PROCEDURE — 80053 COMPREHEN METABOLIC PANEL: CPT

## 2019-05-22 PROCEDURE — 87086 URINE CULTURE/COLONY COUNT: CPT

## 2019-05-22 PROCEDURE — 77030005514 HC CATH URETH FOL14 BARD -A

## 2019-05-22 RX ADMIN — SODIUM CHLORIDE 1000 ML: 900 INJECTION, SOLUTION INTRAVENOUS at 23:30

## 2019-05-23 NOTE — ED PROVIDER NOTES
HPI   History of present illness: The patient lives at the [facility]. They have noted decreased urine output today. Patient perhaps is less responsive as well. Patient is a poor historian and is unable to give any useful history at this time. He is awake and oriented to name and place. He doesn't have any complaints at this time. Past Medical History:   Diagnosis Date    A-fib ([address]     Hypertension     Spondylosis     Thyroid disease     TIA (transient ischemic attack)     Urinary retention        Past Surgical History:   Procedure Laterality Date    HX PROSTATECTOMY  1994    HX SPLENECTOMY      IR ASP BLADDER SUPRA CATH  4/10/2019         History reviewed. No pertinent family history.     Social History     Socioeconomic History    Marital status:      Spouse name: Not on file    Number of children: Not on file    Years of education: Not on file    Highest education level: Not on file   Occupational History    Not on file   Social Needs    Financial resource strain: Not on file    Food insecurity:     Worry: Not on file     Inability: Not on file    Transportation needs:     Medical: Not on file     Non-medical: Not on file   Tobacco Use    Smoking status: Never Smoker    Smokeless tobacco: Never Used   Substance and Sexual Activity    Alcohol use: No     Frequency: Never    Drug use: Not Currently    Sexual activity: Not on file   Lifestyle    Physical activity:     Days per week: Not on file     Minutes per session: Not on file    Stress: Not on file   Relationships    Social connections:     Talks on phone: Not on file     Gets together: Not on file     Attends [employer] service: Not on file     Active member of club or organization: Not on file     Attends meetings of clubs or organizations: Not on file     Relationship status: Not on file    Intimate partner violence:     Fear of current or ex partner: Not on file     Emotionally abused: Not on file Physically abused: Not on file     Forced sexual activity: Not on file   Other Topics Concern    Not on file   Social History Narrative    Not on file         ALLERGIES: Codeine    Review of Systems   All other systems reviewed and are negative. There were no vitals filed for this visit. Physical Exam   Constitutional:   Elderly/frail   HENT:   Head: Normocephalic and atraumatic. Mouth/Throat: Oropharynx is clear and moist.   Dry mouth   Eyes: Pupils are equal, round, and reactive to light. EOM are normal.   Neck: Normal range of motion. Neck supple. Cardiovascular: Normal rate, normal heart sounds and intact distal pulses. Exam reveals no gallop and no friction rub. No murmur heard. Irregular rhythm     Pulmonary/Chest: Effort normal. No respiratory distress. He has no wheezes. He has no rales. Abdominal: Soft. He exhibits mass. There is no tenderness. There is no rebound. Suprapubic mass;small scar R of umbilicus   Musculoskeletal: Normal range of motion. He exhibits no tenderness. Neurological: He is alert. No cranial nerve deficit. Motor; symmetric   Skin: No erythema. Psychiatric: He has a normal mood and affect. His behavior is normal.   Nursing note and vitals reviewed.        MDM       Procedures

## 2019-05-23 NOTE — ED NOTES
Call [facility] to give report officer ismael stated she would pass the message to a nurse since no one was available .

## 2019-05-23 NOTE — DISCHARGE INSTRUCTIONS
Patient Education        Urinary Retention: Care Instructions  Your Care Instructions    Urinary retention means that you aren't able to urinate. In men, it is often caused by a blockage of the urinary tract from an enlarged prostate gland. In men and women, it can also be caused by an infection or nerve damage. Or it may be a side effect of a medicine. The doctor may have drained the urine from your bladder. If you still have problems passing urine, you may need to use a catheter at home. This is used to empty your bladder until the problem can be fixed. Your doctor may put a catheter in your bladder before you go home. If so, he or she will tell you when to come back to have the catheter removed. The doctor has checked you closely. But problems can develop later. If you notice any problems or new symptoms, get medical treatment right away. Follow-up care is a key part of your treatment and safety. Be sure to make and go to all appointments, and call your doctor if you are having problems. It's also a good idea to know your test results and keep a list of the medicines you take. How can you care for yourself at home? · Take your medicines exactly as prescribed. Call your doctor if you think you are having a problem with your medicine. You will get more details on the specific medicines your doctor prescribes. · Check with your doctor before you use any over-the-counter medicines. Many cold and allergy medicines, for example, can make this problem worse. Make sure your doctor knows all of the medicines, vitamins, supplements, and herbal remedies you take. · Spread out through the day the amount of fluid you drink. Do not drink a lot at bedtime. · Avoid alcohol and caffeine. · If you have been given a catheter, or if one is already in place, follow the instructions you were given. Always wash your hands before and after you handle the catheter. When should you call for help?   Call your doctor now or seek immediate medical care if:    · You cannot urinate at all, or it is getting harder to urinate.     · You have symptoms of a urinary tract infection. These may include:  ? Pain or burning when you urinate. ? A frequent need to urinate without being able to pass much urine. ? Pain in the flank, which is just below the rib cage and above the waist on either side of the back. ? Blood in your urine. ? A fever.    Watch closely for changes in your health, and be sure to contact your doctor if:    · You have any problems with your catheter.     · You do not get better as expected. Where can you learn more? Go to http://jey-crissy.info/. Enter M244 in the search box to learn more about \"Urinary Retention: Care Instructions. \"  Current as of: March 20, 2018  Content Version: 11.9  © 6139-4846 [employer]. Care instructions adapted under license by [employer] (which disclaims liability or warranty for this information). If you have questions about a medical condition or this instruction, always ask your healthcare professional. [address] any warranty or liability for your use of this information.

## 2019-05-23 NOTE — ED NOTES
Discharge instructions given to pt by MD and RN . Pt has been given counseling on med use and verbalizes  understanding . IV discontinued .  [facility] transported patient

## 2019-05-23 NOTE — ED TRIAGE NOTES
Pt arrives via EMS from [facility] for \"clogged\" yee catheter. EMS reports facility is reporting that the yee hasn't been draining since this AM and that patient's mentation is not at baseline. Baseline A&Ox2. . Pt arrives with yee in place with minimal output.

## 2019-05-25 LAB
BACTERIA SPEC CULT: ABNORMAL
BACTERIA SPEC CULT: ABNORMAL
CC UR VC: ABNORMAL
SERVICE CMNT-IMP: ABNORMAL

## 2019-05-25 NOTE — PROGRESS NOTES
I called and spoke with staff and [facility] concerning urine culture.   Result faxed via [facility] to [phone]

## 2019-05-27 ENCOUNTER — APPOINTMENT (OUTPATIENT)
Dept: CT IMAGING | Age: 84
DRG: 698 | End: 2019-05-27
Attending: EMERGENCY MEDICINE
Payer: MEDICARE

## 2019-05-27 ENCOUNTER — APPOINTMENT (OUTPATIENT)
Dept: GENERAL RADIOLOGY | Age: 84
DRG: 698 | End: 2019-05-27
Attending: EMERGENCY MEDICINE
Payer: MEDICARE

## 2019-05-27 ENCOUNTER — HOSPITAL ENCOUNTER (INPATIENT)
Age: 84
LOS: 5 days | Discharge: HOSPICE | DRG: 698 | End: 2019-06-01
Attending: EMERGENCY MEDICINE | Admitting: INTERNAL MEDICINE
Payer: MEDICARE

## 2019-05-27 DIAGNOSIS — F05 DELIRIUM DUE TO GENERAL MEDICAL CONDITION: ICD-10-CM

## 2019-05-27 DIAGNOSIS — Z71.89 ADVANCED CARE PLANNING/COUNSELING DISCUSSION: ICD-10-CM

## 2019-05-27 DIAGNOSIS — F02.818 DEMENTIA ASSOCIATED WITH OTHER UNDERLYING DISEASE WITH BEHAVIORAL DISTURBANCE: ICD-10-CM

## 2019-05-27 DIAGNOSIS — R82.81 STERILE PYURIA: ICD-10-CM

## 2019-05-27 DIAGNOSIS — A41.9 SEPSIS, DUE TO UNSPECIFIED ORGANISM: Primary | ICD-10-CM

## 2019-05-27 DIAGNOSIS — Z71.89 GOALS OF CARE, COUNSELING/DISCUSSION: ICD-10-CM

## 2019-05-27 PROBLEM — F02.80 ALZHEIMER'S DEMENTIA (HCC): Status: ACTIVE | Noted: 2019-05-27

## 2019-05-27 PROBLEM — E86.0 DEHYDRATION: Status: ACTIVE | Noted: 2019-05-27

## 2019-05-27 PROBLEM — E87.6 HYPOKALEMIA: Status: ACTIVE | Noted: 2019-05-27

## 2019-05-27 PROBLEM — N30.00 ACUTE CYSTITIS WITHOUT HEMATURIA: Status: ACTIVE | Noted: 2019-05-27

## 2019-05-27 PROBLEM — G30.9 ALZHEIMER'S DEMENTIA (HCC): Status: ACTIVE | Noted: 2019-05-27

## 2019-05-27 PROBLEM — R33.9 URINARY RETENTION: Status: ACTIVE | Noted: 2019-05-27

## 2019-05-27 LAB
ALBUMIN SERPL-MCNC: 2.7 G/DL (ref 3.5–5)
ALBUMIN/GLOB SERPL: 0.7 {RATIO} (ref 1.1–2.2)
ALP SERPL-CCNC: 75 U/L (ref 45–117)
ALT SERPL-CCNC: 32 U/L (ref 12–78)
ANION GAP SERPL CALC-SCNC: 8 MMOL/L (ref 5–15)
APPEARANCE UR: ABNORMAL
AST SERPL-CCNC: 35 U/L (ref 15–37)
BACTERIA URNS QL MICRO: ABNORMAL /HPF
BASOPHILS # BLD: 0 K/UL (ref 0–0.1)
BASOPHILS NFR BLD: 0 % (ref 0–1)
BILIRUB SERPL-MCNC: 1.1 MG/DL (ref 0.2–1)
BILIRUB UR QL CFM: NEGATIVE
BUN SERPL-MCNC: 34 MG/DL (ref 6–20)
BUN/CREAT SERPL: 38 (ref 12–20)
CALCIUM SERPL-MCNC: 8.9 MG/DL (ref 8.5–10.1)
CHLORIDE SERPL-SCNC: 111 MMOL/L (ref 97–108)
CO2 SERPL-SCNC: 27 MMOL/L (ref 21–32)
COLOR UR: YELLOW
COMMENT, HOLDF: NORMAL
CREAT SERPL-MCNC: 0.89 MG/DL (ref 0.7–1.3)
DIFFERENTIAL METHOD BLD: ABNORMAL
EOSINOPHIL # BLD: 0.2 K/UL (ref 0–0.4)
EOSINOPHIL NFR BLD: 1 % (ref 0–7)
EPITH CASTS URNS QL MICRO: ABNORMAL /LPF
ERYTHROCYTE [DISTWIDTH] IN BLOOD BY AUTOMATED COUNT: 18.7 % (ref 11.5–14.5)
GLOBULIN SER CALC-MCNC: 4.1 G/DL (ref 2–4)
GLUCOSE BLD STRIP.AUTO-MCNC: 102 MG/DL (ref 65–100)
GLUCOSE SERPL-MCNC: 117 MG/DL (ref 65–100)
GLUCOSE UR STRIP.AUTO-MCNC: NEGATIVE MG/DL
HCT VFR BLD AUTO: 39.7 % (ref 36.6–50.3)
HGB BLD-MCNC: 12.5 G/DL (ref 12.1–17)
HGB UR QL STRIP: ABNORMAL
IMM GRANULOCYTES # BLD AUTO: 0 K/UL (ref 0–0.04)
IMM GRANULOCYTES NFR BLD AUTO: 0 % (ref 0–0.5)
KETONES UR QL STRIP.AUTO: NEGATIVE MG/DL
LACTATE BLD-SCNC: 2.12 MMOL/L (ref 0.4–2)
LACTATE BLD-SCNC: 2.42 MMOL/L (ref 0.4–2)
LACTATE SERPL-SCNC: 1.7 MMOL/L (ref 0.4–2)
LACTATE SERPL-SCNC: 2.3 MMOL/L (ref 0.4–2)
LEUKOCYTE ESTERASE UR QL STRIP.AUTO: ABNORMAL
LYMPHOCYTES # BLD: 5.4 K/UL (ref 0.8–3.5)
LYMPHOCYTES NFR BLD: 36 % (ref 12–49)
MCH RBC QN AUTO: 29.9 PG (ref 26–34)
MCHC RBC AUTO-ENTMCNC: 31.5 G/DL (ref 30–36.5)
MCV RBC AUTO: 95 FL (ref 80–99)
MONOCYTES # BLD: 0.7 K/UL (ref 0–1)
MONOCYTES NFR BLD: 5 % (ref 5–13)
NEUTS SEG # BLD: 8.5 K/UL (ref 1.8–8)
NEUTS SEG NFR BLD: 58 % (ref 32–75)
NITRITE UR QL STRIP.AUTO: NEGATIVE
NRBC # BLD: 0 K/UL (ref 0–0.01)
NRBC BLD-RTO: 0 PER 100 WBC
PH UR STRIP: 6 [PH] (ref 5–8)
PLATELET # BLD AUTO: 298 K/UL (ref 150–400)
PMV BLD AUTO: 10.5 FL (ref 8.9–12.9)
POTASSIUM SERPL-SCNC: 3.2 MMOL/L (ref 3.5–5.1)
PROT SERPL-MCNC: 6.8 G/DL (ref 6.4–8.2)
PROT UR STRIP-MCNC: 100 MG/DL
RBC # BLD AUTO: 4.18 M/UL (ref 4.1–5.7)
RBC #/AREA URNS HPF: >100 /HPF (ref 0–5)
SAMPLES BEING HELD,HOLD: NORMAL
SERVICE CMNT-IMP: ABNORMAL
SODIUM SERPL-SCNC: 146 MMOL/L (ref 136–145)
SP GR UR REFRACTOMETRY: 1.03 (ref 1–1.03)
UA: UC IF INDICATED,UAUC: ABNORMAL
UROBILINOGEN UR QL STRIP.AUTO: 1 EU/DL (ref 0.2–1)
WBC # BLD AUTO: 14.8 K/UL (ref 4.1–11.1)
WBC URNS QL MICRO: >100 /HPF (ref 0–4)
YEAST URNS QL MICRO: PRESENT

## 2019-05-27 PROCEDURE — 74011250637 HC RX REV CODE- 250/637: Performed by: INTERNAL MEDICINE

## 2019-05-27 PROCEDURE — 70450 CT HEAD/BRAIN W/O DYE: CPT

## 2019-05-27 PROCEDURE — 87040 BLOOD CULTURE FOR BACTERIA: CPT

## 2019-05-27 PROCEDURE — 85025 COMPLETE CBC W/AUTO DIFF WBC: CPT

## 2019-05-27 PROCEDURE — 74011250636 HC RX REV CODE- 250/636: Performed by: INTERNAL MEDICINE

## 2019-05-27 PROCEDURE — 82962 GLUCOSE BLOOD TEST: CPT

## 2019-05-27 PROCEDURE — 96361 HYDRATE IV INFUSION ADD-ON: CPT

## 2019-05-27 PROCEDURE — 96360 HYDRATION IV INFUSION INIT: CPT

## 2019-05-27 PROCEDURE — 65660000000 HC RM CCU STEPDOWN

## 2019-05-27 PROCEDURE — 80053 COMPREHEN METABOLIC PANEL: CPT

## 2019-05-27 PROCEDURE — 83605 ASSAY OF LACTIC ACID: CPT

## 2019-05-27 PROCEDURE — 74011000258 HC RX REV CODE- 258: Performed by: INTERNAL MEDICINE

## 2019-05-27 PROCEDURE — 71045 X-RAY EXAM CHEST 1 VIEW: CPT

## 2019-05-27 PROCEDURE — 81001 URINALYSIS AUTO W/SCOPE: CPT

## 2019-05-27 PROCEDURE — 93005 ELECTROCARDIOGRAM TRACING: CPT

## 2019-05-27 PROCEDURE — 74011250636 HC RX REV CODE- 250/636: Performed by: EMERGENCY MEDICINE

## 2019-05-27 PROCEDURE — 36415 COLL VENOUS BLD VENIPUNCTURE: CPT

## 2019-05-27 PROCEDURE — 87086 URINE CULTURE/COLONY COUNT: CPT

## 2019-05-27 PROCEDURE — 99285 EMERGENCY DEPT VISIT HI MDM: CPT

## 2019-05-27 PROCEDURE — 74011000258 HC RX REV CODE- 258: Performed by: EMERGENCY MEDICINE

## 2019-05-27 RX ORDER — LEVOTHYROXINE SODIUM 50 UG/1
50 TABLET ORAL
Status: DISCONTINUED | OUTPATIENT
Start: 2019-05-28 | End: 2019-05-29

## 2019-05-27 RX ORDER — LACTULOSE 10 G/15ML
10 SOLUTION ORAL; RECTAL
Status: DISCONTINUED | OUTPATIENT
Start: 2019-05-27 | End: 2019-06-01 | Stop reason: HOSPADM

## 2019-05-27 RX ORDER — TAMSULOSIN HYDROCHLORIDE 0.4 MG/1
0.4 CAPSULE ORAL
Status: DISCONTINUED | OUTPATIENT
Start: 2019-05-27 | End: 2019-05-27

## 2019-05-27 RX ORDER — LEVOTHYROXINE SODIUM 25 UG/1
50 TABLET ORAL
Status: DISCONTINUED | OUTPATIENT
Start: 2019-05-28 | End: 2019-06-01 | Stop reason: HOSPADM

## 2019-05-27 RX ORDER — ACETAMINOPHEN 325 MG/1
650 TABLET ORAL
Status: DISCONTINUED | OUTPATIENT
Start: 2019-05-27 | End: 2019-06-01 | Stop reason: HOSPADM

## 2019-05-27 RX ORDER — AMLODIPINE BESYLATE 10 MG/1
10 TABLET ORAL DAILY
COMMUNITY

## 2019-05-27 RX ORDER — CLONAZEPAM 0.5 MG/1
0.5 TABLET ORAL
COMMUNITY

## 2019-05-27 RX ORDER — DONEPEZIL HYDROCHLORIDE 5 MG/1
5 TABLET, FILM COATED ORAL DAILY
Status: DISCONTINUED | OUTPATIENT
Start: 2019-05-27 | End: 2019-06-01 | Stop reason: HOSPADM

## 2019-05-27 RX ORDER — AMLODIPINE BESYLATE 5 MG/1
10 TABLET ORAL DAILY
Status: DISCONTINUED | OUTPATIENT
Start: 2019-05-28 | End: 2019-06-01 | Stop reason: HOSPADM

## 2019-05-27 RX ORDER — SODIUM CHLORIDE 9 MG/ML
125 INJECTION, SOLUTION INTRAVENOUS CONTINUOUS
Status: DISCONTINUED | OUTPATIENT
Start: 2019-05-27 | End: 2019-05-28

## 2019-05-27 RX ORDER — CLONAZEPAM 0.5 MG/1
0.5 TABLET ORAL
Status: DISCONTINUED | OUTPATIENT
Start: 2019-05-27 | End: 2019-05-30

## 2019-05-27 RX ORDER — SODIUM CHLORIDE 0.9 % (FLUSH) 0.9 %
5-40 SYRINGE (ML) INJECTION AS NEEDED
Status: DISCONTINUED | OUTPATIENT
Start: 2019-05-27 | End: 2019-06-01 | Stop reason: HOSPADM

## 2019-05-27 RX ORDER — ASPIRIN 81 MG/1
81 TABLET ORAL DAILY
Status: DISCONTINUED | OUTPATIENT
Start: 2019-05-28 | End: 2019-06-01 | Stop reason: HOSPADM

## 2019-05-27 RX ORDER — LEVOTHYROXINE SODIUM 50 UG/1
50 TABLET ORAL
COMMUNITY

## 2019-05-27 RX ORDER — SODIUM CHLORIDE 0.9 % (FLUSH) 0.9 %
5-40 SYRINGE (ML) INJECTION EVERY 8 HOURS
Status: DISCONTINUED | OUTPATIENT
Start: 2019-05-27 | End: 2019-06-01 | Stop reason: HOSPADM

## 2019-05-27 RX ORDER — MEMANTINE HYDROCHLORIDE 10 MG/1
10 TABLET ORAL 2 TIMES DAILY
Status: DISCONTINUED | OUTPATIENT
Start: 2019-05-27 | End: 2019-05-27

## 2019-05-27 RX ORDER — VANCOMYCIN 2 GRAM/500 ML IN 0.9 % SODIUM CHLORIDE INTRAVENOUS
2000 ONCE
Status: COMPLETED | OUTPATIENT
Start: 2019-05-28 | End: 2019-05-28

## 2019-05-27 RX ORDER — HEPARIN SODIUM 5000 [USP'U]/ML
5000 INJECTION, SOLUTION INTRAVENOUS; SUBCUTANEOUS 2 TIMES DAILY
Status: DISCONTINUED | OUTPATIENT
Start: 2019-05-27 | End: 2019-06-01 | Stop reason: HOSPADM

## 2019-05-27 RX ORDER — SODIUM CHLORIDE 9 MG/ML
125 INJECTION, SOLUTION INTRAVENOUS CONTINUOUS
Status: DISCONTINUED | OUTPATIENT
Start: 2019-05-27 | End: 2019-05-27 | Stop reason: SDUPTHER

## 2019-05-27 RX ORDER — LEVOTHYROXINE SODIUM 75 UG/1
75 TABLET ORAL
Status: DISCONTINUED | OUTPATIENT
Start: 2019-05-27 | End: 2019-05-27

## 2019-05-27 RX ORDER — ACETAMINOPHEN 325 MG/1
650 TABLET ORAL
COMMUNITY

## 2019-05-27 RX ORDER — LOSARTAN POTASSIUM 50 MG/1
100 TABLET ORAL DAILY
Status: DISCONTINUED | OUTPATIENT
Start: 2019-05-27 | End: 2019-05-27

## 2019-05-27 RX ORDER — CLONAZEPAM 0.5 MG/1
0.5 TABLET ORAL
Status: DISCONTINUED | OUTPATIENT
Start: 2019-05-27 | End: 2019-05-28 | Stop reason: SDUPTHER

## 2019-05-27 RX ORDER — ONDANSETRON 2 MG/ML
4 INJECTION INTRAMUSCULAR; INTRAVENOUS
Status: DISCONTINUED | OUTPATIENT
Start: 2019-05-27 | End: 2019-06-01 | Stop reason: HOSPADM

## 2019-05-27 RX ORDER — AMLODIPINE BESYLATE 5 MG/1
10 TABLET ORAL DAILY
Status: DISCONTINUED | OUTPATIENT
Start: 2019-05-27 | End: 2019-05-27

## 2019-05-27 RX ORDER — ASPIRIN 81 MG/1
81 TABLET ORAL DAILY
COMMUNITY

## 2019-05-27 RX ORDER — LACTULOSE 10 G/15ML
10 SOLUTION ORAL; RECTAL
COMMUNITY

## 2019-05-27 RX ADMIN — SODIUM CHLORIDE 1000 ML: 900 INJECTION, SOLUTION INTRAVENOUS at 03:24

## 2019-05-27 RX ADMIN — SODIUM CHLORIDE 100 ML/HR: 900 INJECTION, SOLUTION INTRAVENOUS at 06:36

## 2019-05-27 RX ADMIN — CEFEPIME HYDROCHLORIDE 1 G: 1 INJECTION, POWDER, FOR SOLUTION INTRAMUSCULAR; INTRAVENOUS at 04:30

## 2019-05-27 RX ADMIN — SODIUM CHLORIDE 500 ML: 900 INJECTION, SOLUTION INTRAVENOUS at 04:49

## 2019-05-27 RX ADMIN — HEPARIN SODIUM 5000 UNITS: 5000 INJECTION INTRAVENOUS; SUBCUTANEOUS at 19:17

## 2019-05-27 RX ADMIN — SODIUM CHLORIDE 125 ML/HR: 900 INJECTION, SOLUTION INTRAVENOUS at 13:19

## 2019-05-27 RX ADMIN — SODIUM CHLORIDE 1000 ML: 9 INJECTION, SOLUTION INTRAVENOUS at 01:07

## 2019-05-27 RX ADMIN — CLONAZEPAM 0.5 MG: 0.5 TABLET ORAL at 23:25

## 2019-05-27 RX ADMIN — SODIUM CHLORIDE 125 ML/HR: 900 INJECTION, SOLUTION INTRAVENOUS at 17:44

## 2019-05-27 RX ADMIN — CEFTRIAXONE 1 G: 1 INJECTION, POWDER, FOR SOLUTION INTRAMUSCULAR; INTRAVENOUS at 17:44

## 2019-05-27 RX ADMIN — Medication 10 ML: at 17:50

## 2019-05-27 NOTE — ED TRIAGE NOTES
Patient arrived via EMS from [address] ([facility]) for obstructed urinary catheter. EMS states facility stated the catheter stopped draining at about 2000. Catheter is draining upon arrival.  Patient arrived with DuoNeb running.   Per EMS breath sounds were diminished upon arrival.

## 2019-05-27 NOTE — PROGRESS NOTES
Hospitalist Progress Note                               Carlos Lamb MD                                     Answering service: [phone]                               [phone] from in house phone                                         Date of Service:  2019  NAME:  Pedro Knapp  :  1923  MRN:  [id number]      Admission Summary:     77-year-old gentleman with a PMH significant for dementia, hypertension, Afib and off chronic anticoagulation, hypothyroidism, TIA, urinary retention, right AKA, and debility was [facility] from [facility] for blocked suprapubic Yee catheter. EMS staff reported that the patient was wheezing and O2 saturation was 88% on their arrival. They administered one nebulizer treatment en route to the ER. Wheezing was resolved by the time the patient presented to the emergency room. Per the documentation, the patient uses oxygen via nasal cannula as needed at the Nursing home. There was no other documentation of chest pains, cough, fevers, chills, abdominal pain, bladder or bowel irregularities. Reason for follow up:       CC: Blocked Suprapubic Yee catheter. All events since admission reviewed in their entirety. Patient chronically ill-appearing . Unable to give any pertinent history. D/W Nursing and felix's son Debra Winters. Assessment & Plan:     1) : Chronic Suprapubic Yee catheter for urinary retention with resolving blockage. As needed Urology consult. 2) ID: Severe sepsis due to probable chronic yee catheterization and with End-organ dysfunction including Hypotension and Lactic acidemia all present on admission. Afebrile, leukocytosis, resolving Lactic acid level, cultures in progress. Empiric Ceftriaxone. 3) Renal: Dehydration. Continue IVFs. 4) CNS: Acute multifactorial metabolic Encephalopathy. Probable Advanced Alzheimer's Dementia. H/O TIA. 5) CVS: H/O Primary Hypertension. Chronic Afib. (Off chronic anticoagulation). 6) Electrolytes: Hypokalemia. Repletion with F/U.    7) Endocrine: Hypothyroidism. 8) GI: Dysphagia: Swallow eval.    9) BLANCO: H/O right Above knee amputation. 10) Rehab: Overall debility. 11) Prophylaxis: DVT    12) Directives: DNR/DNI with an extremely guarded prognosis. D/W patient's son Kathye Kawasaki. 13) Plan: Anticipate D/C back to The [address] [place] [address] in 3-5 days. Discussed in detail with patient's son Dr Jovanni Cain who can be reached at [phone]. D/W Nursing. Hospital Problems  Date Reviewed: 2/26/2019          Codes Class Noted POA    * (Principal) Dehydration ICD-10-CM: E86.0  ICD-9-CM: 276.51  5/27/2019 Unknown              Physical Examination:      Last 24hrs VS reviewed since prior progress note. Most recent are:  Visit Vitals  /60 (BP 1 Location: Left arm, BP Patient Position: At rest)   Pulse 82   Temp 97.9 °F (36.6 °C)   Resp 15   Wt 79.8 kg (176 lb)   SpO2 96%   BMI 23.87 kg/m²           Constitutional:  Agitated    HEENT: Head is a traumatic,  Un icteric sclera. Pink conjunctiva,no erythema or discharge. Oral mucous moist, oropharynx benign. Neck supple,    Resp:  Decreased air entry Bilaterally. CV:  Regular rhythm, normal rate, no murmurs, gallops, rubs    GI:  Soft, non distended, non tender. normoactive bowel sounds, no hepatosplenomegaly    : Suprapubic Lance catheter with turbid colored urine in bag   Skin  :  No erythema,rash,bullae,dipigmentation     Musculoskeletal:  R. AKA stump. 2+ edema LLE. Neurologic:  Awake, alert. Not oriented.           No intake or output data in the 24 hours ending 05/27/19 1403       Labs:     Recent Labs     05/27/19 0127   WBC 14.8*   HGB 12.5   HCT 39.7        Recent Labs     05/27/19 0127   *   K 3.2*   *   CO2 27   BUN 34*   CREA 0.89   *   CA 8.9     Recent Labs     05/27/19 0127   SGOT 35   ALT 32   AP 75   TBILI 1.1*   TP 6.8   ALB 2.7*   GLOB 4.1*     No results for input(s): INR, PTP, APTT in the last 72 hours. No lab exists for component: INREXT   No results for input(s): FE, TIBC, PSAT, FERR in the last 72 hours. No results found for: FOL, RBCF   No results for input(s): PH, PCO2, PO2 in the last 72 hours. No results for input(s): CPK, CKNDX, TROIQ in the last 72 hours.     No lab exists for component: CPKMB  No results found for: CHOL, CHOLX, CHLST, CHOLV, HDL, LDL, LDLC, DLDLP, TGLX, TRIGL, TRIGP, CHHD, CHHDX  No results found for: Jeanna Santana  Lab Results   Component Value Date/Time    Color YELLOW 05/27/2019 06:32 AM    Appearance TURBID (A) 05/27/2019 06:32 AM    Specific gravity 1.028 05/27/2019 06:32 AM    pH (UA) 6.0 05/27/2019 06:32 AM    Protein 100 (A) 05/27/2019 06:32 AM    Glucose NEGATIVE  05/27/2019 06:32 AM    Ketone NEGATIVE  05/27/2019 06:32 AM    Bilirubin NEGATIVE  05/04/2019 08:35 AM    Urobilinogen 1.0 05/27/2019 06:32 AM    Nitrites NEGATIVE  05/27/2019 06:32 AM    Leukocyte Esterase LARGE (A) 05/27/2019 06:32 AM    Epithelial cells FEW 05/27/2019 06:32 AM    Bacteria 1+ (A) 05/27/2019 06:32 AM    WBC >100 (H) 05/27/2019 06:32 AM    RBC >100 (H) 05/27/2019 06:32 AM         Medications Reviewed:     Current Facility-Administered Medications   Medication Dose Route Frequency    amLODIPine (NORVASC) tablet 10 mg  10 mg Oral DAILY    clonazePAM (KlonoPIN) tablet 0.5 mg  0.5 mg Oral QHS    donepezil (ARICEPT) tablet 5 mg  5 mg Oral DAILY    losartan (COZAAR) tablet 100 mg  100 mg Oral DAILY    rivaroxaban (XARELTO) tablet 20 mg  20 mg Oral DAILY    sodium chloride (NS) flush 5-40 mL  5-40 mL IntraVENous Q8H    sodium chloride (NS) flush 5-40 mL  5-40 mL IntraVENous PRN    acetaminophen (TYLENOL) tablet 650 mg  650 mg Oral Q4H PRN    cefTRIAXone (ROCEPHIN) 1 g in 0.9% sodium chloride (MBP/ADV) 50 mL  1 g IntraVENous Q24H    [START ON 5/28/2019] levothyroxine (SYNTHROID) tablet 50 mcg  50 mcg Oral 6am    0.9% sodium chloride infusion  125 mL/hr IntraVENous CONTINUOUS     Current Outpatient Medications   Medication Sig    aspirin delayed-release 81 mg tablet Take 81 mg by mouth daily.  lactulose (CONSTULOSE) 10 gram/15 mL solution Take 10 g by mouth three (3) times daily as needed.  acetaminophen (TYLENOL) 325 mg tablet Take 650 mg by mouth every four (4) hours as needed for Pain.  levothyroxine (SYNTHROID) 50 mcg tablet Take 50 mcg by mouth Daily (before breakfast).  clonazePAM (KLONOPIN) 0.5 mg tablet Take 0.5 mg by mouth nightly.  amLODIPine (NORVASC) 10 mg tablet Take 10 mg by mouth daily.  donepezil (ARICEPT) 5 mg tablet Take 5 mg by mouth.  furosemide (LASIX) 20 mg tablet Take 20 mg by mouth daily.  losartan (COZAAR) 100 mg tablet Take 100 mg by mouth daily.      ______________________________________________________________________  EXPECTED LENGTH OF STAY: - - -  ACTUAL LENGTH OF STAY:          0                 Lex Lowe MD

## 2019-05-27 NOTE — ED NOTES
Suprapubic catheter flushed with 75ml of sterile water; draining without difficulty. A lot of sediment noted in tea color urine.

## 2019-05-27 NOTE — ED NOTES
Per Dr. Sammie Barron, patient's catheter should be clamped in order to obtain a urine sample. Patient's catheter was clamped. After about an hour, there did not appear to be any output. Patient's catheter was flushed again with ease, and drained with ease. Dr. Sammie Barron notified. Patient taken to CT.

## 2019-05-27 NOTE — ED PROVIDER NOTES
HPI    80year old male with atrial fib HTN, thyroid, TIA, urinary retention presents from nursing home with blocked suprapubic yee catheter. EMS noted patient was wheezing with sats of 88% on their arrival.  They administered 1 neb in route. Patient is on PRN oxgyen per report. Past Medical History:   Diagnosis Date    A-fib ([facility] [address]     Hypertension     Spondylosis     Thyroid disease     TIA (transient ischemic attack)     Urinary retention        Past Surgical History:   Procedure Laterality Date    HX PROSTATECTOMY  1994    HX SPLENECTOMY      IR ASP BLADDER SUPRA CATH  4/10/2019         History reviewed. No pertinent family history.     Social History     Socioeconomic History    Marital status:      Spouse name: Not on file    Number of children: Not on file    Years of education: Not on file    Highest education level: Not on file   Occupational History    Not on file   Social Needs    Financial resource strain: Not on file    Food insecurity:     Worry: Not on file     Inability: Not on file    Transportation needs:     Medical: Not on file     Non-medical: Not on file   Tobacco Use    Smoking status: Never Smoker    Smokeless tobacco: Never Used   Substance and Sexual Activity    Alcohol use: No     Frequency: Never    Drug use: Not Currently    Sexual activity: Not on file   Lifestyle    Physical activity:     Days per week: Not on file     Minutes per session: Not on file    Stress: Not on file   Relationships    Social connections:     Talks on phone: Not on file     Gets together: Not on file     Attends Congregation service: Not on file     Active member of club or organization: Not on file     Attends meetings of clubs or organizations: Not on file     Relationship status: Not on file    Intimate partner violence:     Fear of current or ex partner: Not on file     Emotionally abused: Not on file     Physically abused: Not on file     Forced sexual activity: Not on file   Other Topics Concern    Not on file   Social History Narrative    Not on file         ALLERGIES: Codeine    Review of Systems   Unable to perform ROS: Mental status change       Vitals:    05/27/19 0050   BP: 97/50   Pulse: 87   SpO2: (!) 88%            Physical Exam   Constitutional: He appears well-developed and well-nourished. No distress. Chronically ill appearing   HENT:   Head: Normocephalic and atraumatic. Mouth/Throat: No oropharyngeal exudate. Eyes: Pupils are equal, round, and reactive to light. Right eye exhibits no discharge. Left eye exhibits no discharge. No scleral icterus. Neck: Normal range of motion. Neck supple. No JVD present. Cardiovascular: Normal rate, regular rhythm and normal heart sounds. No murmur heard. Pulmonary/Chest: Effort normal and breath sounds normal. No stridor. No respiratory distress. He has no wheezes. He has no rales. He exhibits no tenderness. Abdominal: Soft. Bowel sounds are normal. He exhibits no distension and no mass. There is no tenderness. There is no rebound and no guarding. Suprapubic catheter in place   Musculoskeletal: Normal range of motion. Neurological: He is alert. Skin: Skin is warm and dry. Capillary refill takes less than 2 seconds. No rash noted. MDM       Procedures      ED EKG interpretation:  Rhythm: atrial fib; and irregular. Rate (approx.): 91; Axis: left axis deviation; P wave: ; QRS interval: normal ; ST/T wave: non-specific changes; PVC's. This EKG was interpreted by Wilian Boucher MD,ED Provider. Wbc elevated, lactate 2.12. Fluids/cultures/antibiotics and admit.

## 2019-05-27 NOTE — PROGRESS NOTES
Consult received and appreciated, however order entered per protocol, which is a nursing order. SLP requires a physician's order to complete swallowing evaluation. Note patient failed nursing dysphagia pre-screening due to lethargy. Please complete PO portion of screen once alert. If formal evaluation is desired, please re-consult with MD order. Thanks. Dee Curtis M.CD.  CCC-SLP

## 2019-05-27 NOTE — PROGRESS NOTES
Admission Medication Reconciliation:    Information obtained from: Transfer documents from The [address] [place] [address] ([phone], spoke with Hu Donovan LPN), RX query information    Significant PMH/Disease States:   Past Medical History:   Diagnosis Date    A-fib ([address]     Hypertension     Spondylosis     Thyroid disease     TIA (transient ischemic attack)     Urinary retention        Chief Complaint for this Admission:  Urinary catheter problem    Allergies:  Codeine    Prior to Admission Medications:   Prior to Admission Medications   Prescriptions Last Dose Informant Patient Reported? Taking?   acetaminophen (TYLENOL) 325 mg tablet 5/20/2019 at Unknown time  Yes Yes   Sig: Take 650 mg by mouth every four (4) hours as needed for Pain. amLODIPine (NORVASC) 10 mg tablet 5/26/2019 at Unknown time  Yes Yes   Sig: Take 10 mg by mouth daily. aspirin delayed-release 81 mg tablet 5/26/2019 at Unknown time  Yes Yes   Sig: Take 81 mg by mouth daily. clonazePAM (KLONOPIN) 0.5 mg tablet 5/26/2019 at Unknown time  Yes Yes   Sig: Take 0.5 mg by mouth nightly. donepezil (ARICEPT) 5 mg tablet 5/26/2019 at Unknown time  Yes Yes   Sig: Take 5 mg by mouth. furosemide (LASIX) 20 mg tablet 5/26/2019 at Unknown time  Yes Yes   Sig: Take 20 mg by mouth daily. lactulose (CONSTULOSE) 10 gram/15 mL solution 5/20/2019 at Unknown time  Yes Yes   Sig: Take 10 g by mouth three (3) times daily as needed. levothyroxine (SYNTHROID) 50 mcg tablet 5/26/2019 at Unknown time  Yes Yes   Sig: Take 50 mcg by mouth Daily (before breakfast). losartan (COZAAR) 100 mg tablet 5/26/2019 at Unknown time  Yes Yes   Sig: Take 100 mg by mouth daily. Facility-Administered Medications: None         Comments/Recommendations: Medication list and administration times provided entirely by list and LPN at [address] [place] [address]. TT to Dr Maryjane Patel ( hospitalist) to update re changes to PTA med list.    Notes:  1.  Xarelto: 3/12/10-5/17/19, switched to ASA 81 mg at facility (atrial fib on monitor)  2. Clonazepam: dose recently reduced, takes for PTSD  3. Levothyroxine: has been 50 mcg for several months  4. Suprapubic catheter in place    Added:  1. ASA  2. Constulose  3. APAP  4. Losartan    Revised:  1. Levothyroxine dose reduced  2. Clonazepam reduced dose from 1 mg BID to 0.5 mg QHS  3. Aricept to QHS    Deleted:  1. Ampicillin  2. Lidex  3. Memantine  4. Potassium  5. Xarelto  6. Tamsulosin (has suprapubic catheter)  7. Bactrim DS  8. Valsartan  9. Ventolin    Thank you for allowing me to participate in the care of your patient.     Janette Adams PharmD, RN #[id number]

## 2019-05-27 NOTE — ED NOTES
Bedside and Verbal shift change report given to Amanda Lay RN (oncoming nurse) by Ananda Priest RN (offgoing nurse).  Report included the following information SBAR, ED Summary, MAR, Recent Results and Cardiac Rhythm SR.

## 2019-05-27 NOTE — ACP (ADVANCE CARE PLANNING)
Advance Care Planning Note    Name: Emilia Lundberg  YOB: 1923  MRN: [id number]  Admission Date: 5/27/2019 12:45 AM    Date of discussion: 5/27/2019    Active Diagnoses:    Hospital Problems  Date Reviewed: 5/27/2019          Codes Class Noted POA    Sepsis ([facility] [address] ICD-10-CM: A41.9  ICD-9-CM: 038.9, 995.91  5/27/2019 Yes        Urinary retention ICD-10-CM: R33.9  ICD-9-CM: 788.20  5/27/2019 Yes        * (Principal) Dehydration ICD-10-CM: E86.0  ICD-9-CM: 276.51  5/27/2019 Yes        Hypokalemia ICD-10-CM: E87.6  ICD-9-CM: 276.8  5/27/2019 Yes        Acute cystitis without hematuria ICD-10-CM: N30.00  ICD-9-CM: 595.0  5/27/2019 Yes        Alzheimer's dementia ICD-10-CM: G30.9, F02.80  ICD-9-CM: 331.0  5/27/2019 Yes               These active diagnoses are of sufficient risk that focused discussion on advance care planning is indicated in order to allow the patient to thoughtfully consider personal goals of care, and if situations arise that prevent the ability to personally give input, to ensure appropriate representation of their personal desires for different levels and aggressiveness of care. Discussion:     Persons present and participating in discussion: Emilia Lundberg, patient's son Dr Montilla Gandara and Paulina Tesfaye MD.    Discussion: Addressed decompensated medical problems, Co-morbidities, Advance Directives, Prognosis and confirmation of a Surrogate decision maker. Time Spent:     Total time spent  in education and discussion: 20 minutes.      Paulina Tesfaye MD  5/27/2019  2:50 PM

## 2019-05-27 NOTE — H&P
[facility]  HISTORY AND PHYSICAL    Name:  Jess Styles  MR#:  [id number]  :  [id number]  ACCOUNT #:  [de-identified]  ADMIT DATE:  2019    PRIMARY CARE PHYSICIAN:  None. PRESENTING COMPLAINT:  Blocked suprapubic Lance catheter. HISTORY OF PRESENTING COMPLAINT:  The patient is a 72-year-old male with history of dementia, hypertension, AFib, hypothyroidism, TIA, urinary retention, who was sent from nursing home for blocked suprapubic Lance catheter. EMS reported that the patient was wheezing and O2 saturation was 88% on their arrival.  They administered one nebulizer treatment en route. Wheezing was resolved by the time the patient presented to the emergency room. The patient is on oxygen therapy p.r.n. in the nursing home. There is no report of fall or trauma. There is also no report of fever, chills, or rigors. There is no complaint of vomiting, abdominal pain, constipation, or diarrhea. The patient is unable to give any history and there is nobody with him. The patient has been admitted to the hospital due to urinary tract infection with blocked suprapubic catheter. The patient denies any pain. PAST MEDICAL HISTORY:  1.  AFib. 2.  Hypertension. 3.  Spondylosis. 4.  Hypothyroidism. 5.  TIA. 6.  Urinary retention. PAST SURGICAL HISTORY:  1.  Prostatectomy. 2.  Splenectomy. 3.  Bladder suprapubic catheter. MEDICATIONS:  1. Amlodipine 10 mg daily. 2.  Clonazepam 1 mg two times daily. 3.  Donepezil 5 mg daily. 4.  Furosemide 20 mg daily. 5.  Levothyroxine 75 mcg daily. 6.  Losartan 100 mg daily. 7.  Memantine 10 mg b.i.d.  8.  Potassium chloride 10 mEq daily. 9.  Xarelto 20 mg daily. 10.  Tamsulosin 0.4 mg after dinner. 11.  Ventolin inhaler as needed. ALLERGIES:  CODEINE. SOCIAL HISTORY:  The patient is a resident of a nursing home. He does not smoke, drink, or use recreational drug.     FAMILY HISTORY:  The patient is unable to give any history. REVIEW OF SYSTEMS:  The patient is unable to give due to history of dementia. PHYSICAL EXAMINATION:  GENERAL:  The patient is seen lying in bed, in no acute respiratory distress. He is chronically ill-appearing. VITAL SIGNS:  Blood pressure 97/50, pulse 87, respirations 15, O2 sat on room air 88%. HEAD, EYES, EARS, NOSE, AND THROAT:  Atraumatic, normocephalic. Anicteric. No pallor. No jaundice. Extraocular muscles are intact. Pupils bilaterally reactive, equal, and round. NECK:  Supple. No JVD. No carotid bruits. HEART:  Heart sounds 1 and 2 regularly irregular. No gallop, no friction, no rub. RESPIRATIONS:  No wheezing, no rhonchi, no rales. ABDOMEN:  Soft, nontender. Bowel sounds normoactive. NEUROLOGIC:  The patient is alert. SKIN:  Warm and dry. Normal skin color. Normal skin turgor. PSYCH:  Disoriented. EXTREMITIES:  Right AKA. No cyanosis. No edema noted in the left lower extremity. DIAGNOSTIC TESTS:  EKG showed AFib at 91 beats per minute, left axis deviation. WBC 14.8, hemoglobin 12.5, hematocrit 39.7, platelet 446. Sodium 146, potassium 3.2, chloride 111, CO2 of 27, glucose 117. BUN 34, creatinine 0.89. Calcium 8.9. ALT 32, AST 35, alk phos 75. CT head without contrast, no acute findings. Chest x-ray showed no acute findings. ASSESSMENT:  1. Blocked suprapubic Lance catheter. 2.  Leukocytosis and dehydration. 3.  Possible sepsis due to urinary tract infection. 4.  Dementia. 5.  Urinary retention with chronic suprapubic catheter. 6.  Transient ischemic attack. 7.  Hypothyroidism. 8.  Hypertension. 9.  Atrial fibrillation. 10.  Hypokalemia. PLAN:  1. Admit the patient to telemetry under hospitalist service. 2.  IV fluid hydration. Sepsis protocol with lactic levels. 3.  Monitor electrolytes with hydration. 4.  Replace serum potassium. 5.  Monitor kidney function with hydration. 6.  Urinalysis with reflex culture. Lactic acid  7.   Blood cultures. 8.  IV antibiotics including ceftriaxone. 9.  Leukocytosis may be compounded by dehdration. 10.  Follow up CBC. 11.  Monitor vital signs including blood pressure as per unit protocol. 12.  Restart appropriate home medications. 13. DVT prophylaxis, the patient is on Eliquis. 15.  Fall precaution, skin care precaution, aspiration precaution. Out of bed to chair with assistance. 15.  Pt is full code. 16.  Further management will depend on the patient's clinical progression, further evaluation by attending physician, and  result of tests. 17.  Case management consult for discharge planning.         Lynn Vu MD      [id number]  D:  05/27/2019 4:25  T:  05/27/2019 7:27  JOB #:  [id number]

## 2019-05-27 NOTE — ROUTINE PROCESS
TRANSFER - OUT REPORT: 
 
Verbal report given to TRICIA jessica(name) on Rex Arnold  being transferred to [facility](unit) for routine progression of care Report consisted of patients Situation, Background, Assessment and  
Recommendations(SBAR). Information from the following report(s) SBAR, ED Summary and MAR was reviewed with the receiving nurse. Lines:    
 
Opportunity for questions and clarification was provided. Patient transported with: 
 Monitor Registered Nurse

## 2019-05-28 LAB
ANION GAP SERPL CALC-SCNC: 8 MMOL/L (ref 5–15)
ATRIAL RATE: 110 BPM
BACTERIA SPEC CULT: ABNORMAL
BACTERIA SPEC CULT: ABNORMAL
BACTERIA SPEC CULT: NORMAL
BUN SERPL-MCNC: 26 MG/DL (ref 6–20)
BUN/CREAT SERPL: 51 (ref 12–20)
CALCIUM SERPL-MCNC: 8.2 MG/DL (ref 8.5–10.1)
CALCULATED R AXIS, ECG10: -51 DEGREES
CALCULATED T AXIS, ECG11: -122 DEGREES
CC UR VC: NORMAL
CHLORIDE SERPL-SCNC: 116 MMOL/L (ref 97–108)
CO2 SERPL-SCNC: 25 MMOL/L (ref 21–32)
CREAT SERPL-MCNC: 0.51 MG/DL (ref 0.7–1.3)
DIAGNOSIS, 93000: NORMAL
ERYTHROCYTE [DISTWIDTH] IN BLOOD BY AUTOMATED COUNT: 18.9 % (ref 11.5–14.5)
GLUCOSE SERPL-MCNC: 96 MG/DL (ref 65–100)
HCT VFR BLD AUTO: 36.8 % (ref 36.6–50.3)
HGB BLD-MCNC: 11.5 G/DL (ref 12.1–17)
MCH RBC QN AUTO: 29.7 PG (ref 26–34)
MCHC RBC AUTO-ENTMCNC: 31.3 G/DL (ref 30–36.5)
MCV RBC AUTO: 95.1 FL (ref 80–99)
NRBC # BLD: 0 K/UL (ref 0–0.01)
NRBC BLD-RTO: 0 PER 100 WBC
PLATELET # BLD AUTO: 265 K/UL (ref 150–400)
PMV BLD AUTO: 10.3 FL (ref 8.9–12.9)
POTASSIUM SERPL-SCNC: 3.4 MMOL/L (ref 3.5–5.1)
Q-T INTERVAL, ECG07: 312 MS
QRS DURATION, ECG06: 92 MS
QTC CALCULATION (BEZET), ECG08: 383 MS
RBC # BLD AUTO: 3.87 M/UL (ref 4.1–5.7)
SERVICE CMNT-IMP: ABNORMAL
SERVICE CMNT-IMP: NORMAL
SODIUM SERPL-SCNC: 149 MMOL/L (ref 136–145)
VENTRICULAR RATE, ECG03: 91 BPM
WBC # BLD AUTO: 9.7 K/UL (ref 4.1–11.1)

## 2019-05-28 PROCEDURE — 87040 BLOOD CULTURE FOR BACTERIA: CPT

## 2019-05-28 PROCEDURE — 74011250637 HC RX REV CODE- 250/637: Performed by: INTERNAL MEDICINE

## 2019-05-28 PROCEDURE — 92610 EVALUATE SWALLOWING FUNCTION: CPT | Performed by: SPEECH-LANGUAGE PATHOLOGIST

## 2019-05-28 PROCEDURE — 74011250636 HC RX REV CODE- 250/636: Performed by: NURSE PRACTITIONER

## 2019-05-28 PROCEDURE — 74011000258 HC RX REV CODE- 258: Performed by: INTERNAL MEDICINE

## 2019-05-28 PROCEDURE — 36415 COLL VENOUS BLD VENIPUNCTURE: CPT

## 2019-05-28 PROCEDURE — 85027 COMPLETE CBC AUTOMATED: CPT

## 2019-05-28 PROCEDURE — 65660000000 HC RM CCU STEPDOWN

## 2019-05-28 PROCEDURE — 74011250636 HC RX REV CODE- 250/636: Performed by: INTERNAL MEDICINE

## 2019-05-28 PROCEDURE — 80048 BASIC METABOLIC PNL TOTAL CA: CPT

## 2019-05-28 RX ORDER — BALSAM PERU/CASTOR OIL
OINTMENT (GRAM) TOPICAL EVERY 8 HOURS
Status: DISCONTINUED | OUTPATIENT
Start: 2019-05-28 | End: 2019-06-01 | Stop reason: HOSPADM

## 2019-05-28 RX ORDER — POTASSIUM CHLORIDE 14.9 MG/ML
10 INJECTION INTRAVENOUS
Status: COMPLETED | OUTPATIENT
Start: 2019-05-28 | End: 2019-05-28

## 2019-05-28 RX ORDER — SODIUM CHLORIDE 450 MG/100ML
75 INJECTION, SOLUTION INTRAVENOUS ONCE
Status: DISCONTINUED | OUTPATIENT
Start: 2019-05-28 | End: 2019-05-28

## 2019-05-28 RX ORDER — POTASSIUM CHLORIDE 750 MG/1
40 TABLET, FILM COATED, EXTENDED RELEASE ORAL
Status: DISCONTINUED | OUTPATIENT
Start: 2019-05-28 | End: 2019-05-28

## 2019-05-28 RX ORDER — SODIUM CHLORIDE 450 MG/100ML
75 INJECTION, SOLUTION INTRAVENOUS CONTINUOUS
Status: DISPENSED | OUTPATIENT
Start: 2019-05-28 | End: 2019-05-29

## 2019-05-28 RX ADMIN — VANCOMYCIN HYDROCHLORIDE 2000 MG: 10 INJECTION, POWDER, LYOPHILIZED, FOR SOLUTION INTRAVENOUS at 00:27

## 2019-05-28 RX ADMIN — SODIUM CHLORIDE 75 ML/HR: 450 INJECTION, SOLUTION INTRAVENOUS at 09:57

## 2019-05-28 RX ADMIN — SODIUM CHLORIDE 125 ML/HR: 900 INJECTION, SOLUTION INTRAVENOUS at 04:38

## 2019-05-28 RX ADMIN — HEPARIN SODIUM 5000 UNITS: 5000 INJECTION INTRAVENOUS; SUBCUTANEOUS at 10:00

## 2019-05-28 RX ADMIN — POTASSIUM CHLORIDE 10 MEQ: 200 INJECTION, SOLUTION INTRAVENOUS at 10:00

## 2019-05-28 RX ADMIN — HEPARIN SODIUM 5000 UNITS: 5000 INJECTION INTRAVENOUS; SUBCUTANEOUS at 17:46

## 2019-05-28 RX ADMIN — CLONAZEPAM 0.5 MG: 0.5 TABLET ORAL at 22:45

## 2019-05-28 RX ADMIN — CASTOR OIL AND BALSAM, PERU: 788; 87 OINTMENT TOPICAL at 22:45

## 2019-05-28 RX ADMIN — Medication 10 ML: at 14:00

## 2019-05-28 RX ADMIN — CASTOR OIL AND BALSAM, PERU: 788; 87 OINTMENT TOPICAL at 17:45

## 2019-05-28 RX ADMIN — CEFTRIAXONE 1 G: 1 INJECTION, POWDER, FOR SOLUTION INTRAMUSCULAR; INTRAVENOUS at 17:45

## 2019-05-28 RX ADMIN — POTASSIUM CHLORIDE 10 MEQ: 200 INJECTION, SOLUTION INTRAVENOUS at 12:47

## 2019-05-28 RX ADMIN — VANCOMYCIN HYDROCHLORIDE 1000 MG: 1 INJECTION, POWDER, LYOPHILIZED, FOR SOLUTION INTRAVENOUS at 15:53

## 2019-05-28 NOTE — WOUND CARE
Wound Care Note:  
 
New consult placed by nurse request for pressure sore, patient bedrest with contractures in B/L upper extremity, R. Leg amputated below knee Chart shows: 
Admitted for dehydration Past Medical History:  
Diagnosis Date  A-fib ([address]  Hypertension  Spondylosis  Thyroid disease  TIA (transient ischemic attack)  Urinary retention   
' 
WBC = 9.7 on 5/28/19 Admitted from the [facility] Assessment:  
Patient is alert and talking, incontinent with moderate assistance needed in repositioning. Bed: [facility] Patient has a Lance. Diet: NPO Left heel skin intact and without erythema. Right AKA. 1. POA sacrum with two linear lines one on sacrum and the other on the left sacrum, in an area measuring 6 cm x  3 cm x 0 cm, linear lines are non-blanchable with a slight purple hue, wound edges are closed, selina-wound intact. Venelex ointment and specialty bed will be ordered. 2.  POA right ischial with small area of blanchable erythema, no open area. Left open to air. Spoke with Dr. Geeta Zarate, wound care orders obtained. Patient repositioned on right side Heels offloaded on pillow. Recommendations:   
Sacrum and left heel- Every 8 hours liberally apply Venelex ointment. Envision bed [facility] through [facility]. [facility] issues or when patient discharged at ext. [phone] Ensure both frame and blower box at foot of bed are plugged in - blower box will have green glowing light when air surface is on (should be on at all times). Use only flat sheet and one incontinence pad. Confirmation #[id number] Skin Care & Pressure Prevention: 
Minimize layers of linen/pads under patient to optimize support surface. Turn/reposition approximately every 2 hours and offload heels. Manage incontinence / promote continence Discussed above plan with patient &  Meeta RN 
 
 Transition of Care: Plan to follow as needed while admitted to hospital. 
 
Hyla Fothergill" Leamon Abraham, BSN, RN, [facility], [facility]. 
office [phone] 
pager [phone] or call  to page

## 2019-05-28 NOTE — PROGRESS NOTES
Problem: Sepsis: Day of Diagnosis  Goal: *First dose of  appropriate antibiotic within 3 hours of arrival to ED, within 1 hour of arrival to ICU  Outcome: Progressing Towards Goal  Goal: *Lactic acid with first set of blood cultures  Outcome: Progressing Towards Goal    Bedside shift change report given to Aviva Looney RN (oncoming nurse) by Ruy Celis RN (offgoing nurse). Report included the following information SBAR, Kardex, ED Summary, Procedure Summary, Intake/Output, MAR, Accordion, Recent Results, Med Rec Status, Cardiac Rhythm VPaced and Quality Measures.

## 2019-05-28 NOTE — PROGRESS NOTES
SPEECH LANGUAGE PATHOLOGY BEDSIDE SWALLOW EVALUATION  Patient: Alysa Hamilotn (42 y.o. male)  Date: 5/28/2019  Primary Diagnosis: Dehydration [E86.0]        Precautions: fall       ASSESSMENT :  Based on the objective data described below, the patient presents with oral swallow functional for thins and purees, mild pharyngeal dysphagia. Pharyngeal swallow characterized by mild delay and with throat clear noted after successive straw gulps. This was not reproduced with single sips from a cup. Note, per discussion with RN, alertness has been a barrier to safe PO intake. The patient was awake and alert and verbal throughout SLP assessment, though unclear whether he will be able to maintain this appropriately during meal times. Per chart review, his baseline diet is puree/thins. Patient will benefit from skilled intervention to address the above impairments. Patient?s rehabilitation potential is considered to be Fair  Factors which may influence rehabilitation potential include:   ? None noted  ? Mental ability/status  ? Medical condition  ? Home/family situation and support systems  ? Safety awareness  ? Pain tolerance/management  ? Other:      PLAN :  Recommendations and Planned Interventions:  Puree/thins  Feed only when upright and awake and alert  Frequency/Duration: Patient will be followed by speech-language pathology 3 times a week to address goals. Discharge Recommendations: To Be Determined     SUBJECTIVE:   Patient stated ? That water tastes good? .    OBJECTIVE:     Past Medical History:   Diagnosis Date    A-fib ([facility] [address]     Hypertension     Spondylosis     Thyroid disease     TIA (transient ischemic attack)     Urinary retention      Past Surgical History:   Procedure Laterality Date    HX PROSTATECTOMY  1994    HX SPLENECTOMY      IR ASP BLADDER SUPRA CATH  4/10/2019     Prior Level of Function/Home Situation:    Home Situation  Home Environment: Long term care  One/Two Story Residence: One story  Living Alone: No  Support Systems: Child(andrei), Skilled nursing facility  Patient Expects to be Discharged to[de-identified] Skilled nursing facility  Current DME Used/Available at Home: Wheelchair  Diet prior to admission: puree/thins  Current Diet:  NPO   Cognitive and Communication Status:  Neurologic State: Alert, Confused  Orientation Level: Oriented to person  Cognition: Decreased command following     Perseveration: No perseveration noted     Oral Assessment:  Oral Assessment  Labial: No impairment  Dentition: Natural  Oral Hygiene: dry  Lingual: No impairment  Mandible: No impairment  P.O. Trials:  Patient Position: upright in bed  Vocal quality prior to P.O.: No impairment  Consistency Presented: Ice chips; Thin liquid;Puree  How Presented: SLP-fed/presented;Cup/sip;Spoon;Straw     Bolus Acceptance: No impairment  Bolus Formation/Control: No impairment     Propulsion: No impairment  Oral Residue: None  Initiation of Swallow: Delayed (# of seconds)(mild)  Laryngeal Elevation: Functional  Aspiration Signs/Symptoms: Clear throat(with successive straw sips)                Oral Phase Severity: No impairment(for thins and puress)  Pharyngeal Phase Severity : Mild    NOMS:   The NOMS functional outcome measure was used to quantify this patient's level of swallowing impairment. Based on the NOMS, the patient was determined to be at level 4 for swallow function       NOMS Swallowing Levels:  Level 1 (CN): NPO  Level 2 (CM): NPO but takes consistency in therapy  Level 3 (CL): Takes less than 50% of nutrition p.o. and continues with nonoral feedings; and/or safe with mod cues; and/or max diet restriction  Level 4 (CK):  Safe swallow but needs mod cues; and/or mod diet restriction; and/or still requires some nonoral feeding/supplements  Level 5 (CJ): Safe swallow with min diet restriction; and/or needs min cues  Level 6 (CI): Independent with p.o.; rare cues; usually self cues; may need to avoid some foods or needs extra time  Level 7 [facility]): Independent for all p.o.  JANNA. (2003). National Outcomes Measurement System (NOMS): Adult Speech-Language Pathology User's Guide. Pain:  Pain Scale 1: FACES  Pain Intensity 1: 0     After treatment:   ?            Patient left in no apparent distress sitting up in chair  ? Patient left in no apparent distress in bed  ? Call bell left within reach  ? Nursing notified  ? Caregiver present  ? Bed alarm activated    COMMUNICATION/EDUCATION:   The patient?s plan of care including recommendations, planned interventions, and recommended diet changes were discussed with: Registered Nurse. Patient was educated regarding role of SLP. He did not seem to understand, but was agreeable to PO intake. ? Posted safety precautions in patient's room. ? Patient/family have participated as able in goal setting and plan of care. ?            Patient/family agree to work toward stated goals and plan of care. ?            Patient understands intent and goals of therapy, but is neutral about his/her participation. ? Patient is unable to participate in goal setting and plan of care.     Thank you for this referral.  Rigo Viera SLP  Time Calculation: 20 mins

## 2019-05-28 NOTE — CONSULTS
Urology Consult    Patient: Jelly Cortez MRN: [id number]  SSN: [id number]    YOB: 1923  Age: 80 y.o. Sex: male          Date of Consultation:  May 28, 2019  Requesting Physician: Sudeep Coronado MD  Reason for Consultation:    Pre-existing [facility] Patient:   Physician:               History of Present Illness:  Patient is a 80 y.o. male admitted 5/27/2019 to the hospital for Dehydration [E86.0]. He has chronic SPT which was occluded yesterday. Last changed about 2 weeks ago. It was irrigated in the ER and has been draining well since. UA c/w colonization. Past Medical History: Allergies   Allergen Reactions    Codeine Other (comments)     vomiting       Prior to Admission medications    Medication Sig Start Date End Date Taking? Authorizing Provider   aspirin delayed-release 81 mg tablet Take 81 mg by mouth daily. Yes Provider, Historical   lactulose (CONSTULOSE) 10 gram/15 mL solution Take 10 g by mouth three (3) times daily as needed. Yes Provider, Historical   acetaminophen (TYLENOL) 325 mg tablet Take 650 mg by mouth every four (4) hours as needed for Pain. Yes Provider, Historical   levothyroxine (SYNTHROID) 50 mcg tablet Take 50 mcg by mouth Daily (before breakfast). Yes Provider, Historical   clonazePAM (KLONOPIN) 0.5 mg tablet Take 0.5 mg by mouth nightly. Yes Provider, Historical   amLODIPine (NORVASC) 10 mg tablet Take 10 mg by mouth daily. Yes Provider, Historical   donepezil (ARICEPT) 5 mg tablet Take 5 mg by mouth. 11/23/18  Yes Provider, Historical   furosemide (LASIX) 20 mg tablet Take 20 mg by mouth daily. 1/18/19  Yes Provider, Historical   losartan (COZAAR) 100 mg tablet Take 100 mg by mouth daily. 12/4/18  Yes Provider, Historical      PMHx:  has a past medical history of A-fib ([address] Hypertension, Spondylosis, Thyroid disease, TIA (transient ischemic attack), and Urinary retention.    PSurgHx:  has a past surgical history that includes hx prostatectomy (); hx splenectomy; and ir asp bladder supra cath (4/10/2019). PSocHx:  reports that he has never smoked. He has never used smokeless tobacco. He reports that he has current or past drug history. He reports that he does not drink alcohol. ROS:  Admission ROS by Chyna Beyer MD from 2019 were reviewed with the patient and changes (other than per HPI) include: none. All 12 systems were reviewed and were otherwise negative. Physical Exam:            Vitals[de-identified]    Temp (24hrs), Av.4 °F (36.9 °C), Min:97.3 °F (36.3 °C), Max:99.7 °F (37.6 °C)   Blood pressure 126/65, pulse 65, temperature 98.4 °F (36.9 °C), resp. rate 16, weight 79.7 kg (175 lb 9.6 oz), SpO2 96 %. I&O's:     0701 -  1900  In: -   Out: 0750 [id number]   General Sleeping comfortably. SPT draining clear urine.             Lab Results   Component Value Date/Time    WBC 9.7 2019 12:33 AM    HCT 36.8 2019 12:33 AM    PLATELET 144  12:33 AM    Sodium 149 (H) 2019 12:33 AM    Potassium 3.4 (L) 2019 12:33 AM    Chloride 116 (H) 2019 12:33 AM    CO2 25 2019 12:33 AM    BUN 26 (H) 2019 12:33 AM    Creatinine 0.51 (L) 2019 12:33 AM    Glucose 96 2019 12:33 AM    Calcium 8.2 (L) 2019 12:33 AM       UA:   Lab Results   Component Value Date/Time    Color YELLOW 2019 06:32 AM    Appearance TURBID (A) 2019 06:32 AM    Specific gravity 1.028 2019 06:32 AM    pH (UA) 6.0 2019 06:32 AM    Protein 100 (A) 2019 06:32 AM    Glucose NEGATIVE  2019 06:32 AM    Ketone NEGATIVE  2019 06:32 AM    Bilirubin NEGATIVE  2019 08:35 AM    Urobilinogen 1.0 2019 06:32 AM    Nitrites NEGATIVE  2019 06:32 AM    Leukocyte Esterase LARGE (A) 2019 06:32 AM    Epithelial cells FEW 2019 06:32 AM    Bacteria 1+ (A) 2019 06:32 AM    WBC >100 (H) 2019 06:32 AM    RBC >100 (H) 05/27/2019 06:32 AM       Cultures:   Xrays:     Assessment/Plan:   SPT functioning well after irrigation. Follow-up as scheduled for catheter changes. Call if any questions.       Signed By: Oneyda Jacobo MD  - May 28, 2019

## 2019-05-28 NOTE — PROGRESS NOTES
Reason for Admission:  \"Blocked suprapubic Lance catheter\"                  RRAT Score:   13-Low-Green                  Plan for utilizing home health:      Patient is in healthcare at The [address] [place] [address] unit; his wife resides in the [facility]. Current Advanced Directive/Advance Care Plan: Not on file. Patient is a DNR. Patient's son Minnie Luis (lives in [place]) is MPOA. Patient also has a daughter who lives locally and other sons who live out of the area. Likelihood of Readmission:  Low                         Transition of Care Plan:      CM contacted patient's daughter who listed as emergency contact. Patient has been at [facility] for about 2 months. Prior to this, patient was living in [place] with his wife in a group home community. Patient is wheelchair bound and has a right above the knee amputation. Patient's PCP is Dr. Giovanni Cortez. CM spoke with Collin Camacho  at [facility] [phone]; fax [phone] to provide update. CM to further coordinate with Corrine Members regarding patient's transitional care plan.      Michael Jessica MS

## 2019-05-28 NOTE — PROGRESS NOTES
Hospitalist Progress Note                               Rhina Collado MD                                     Answering service: [phone]                               [phone] from in house phone                                         Date of Service:  2019  NAME:  Emily Linares  :  1923  MRN:  [id number]      Admission Summary:     26-year-old gentleman with a PMH significant for dementia, hypertension, Afib and off chronic anticoagulation, hypothyroidism, TIA, urinary retention, right AKA, and debility [facility] for blocked suprapubic Yee catheter. EMS staff reported that the patient was wheezing and O2 saturation was 88% on their arrival. They administered one nebulizer treatment en route to the ER. Wheezing was resolved by the time the patient presented to the emergency room. Per the documentation, the patient uses oxygen via nasal cannula as needed at the Nursing home. There was no other documentation of chest pains, cough, fevers, chills, abdominal pain, bladder or bowel irregularities. Reason for follow up:       CC: Blocked Suprapubic Yee catheter. First encounter  Patient seen and examined by the bedside  Labs, images and notes reviewed  Patient is comfortable at rest, appears to have baseline confusion  Denies any chest pain  No fevers or chills  No nausea or vomiting  Discussed with nursing staff, no acute issues overnight, orders reviewed. Assessment & Plan:     - Chronic Suprapubic Yee catheter for urinary retention with resolving blockage. As needed Urology consult. - Severe sepsis due to probable chronic yee catheterization and with End-organ dysfunction including Hypotension and Lactic acidemia all present on admission.   Resolving now  Afebrile, leukocytosis resolved  BC x 2 show GPC, possible contaminant but given symptoms, cont IV ceftriaxone and Vanc  Repeat BC x 2 obtained  ID consult  Given supra pubic yee, a MRSA urine infection is possible. - Dehydration. Continue IVFs. -Acute multifactorial metabolic Encephalopathy. Probable Advanced Alzheimer's Dementia. H/O TIA. - Primary Hypertension. Chronic Afib. (Off chronic anticoagulation). controlled    - Hypokalemia. Replete, repeat BMP in am.    - Hypothyroidism. Cont home meds    - Dysphagia: Swallow eval.  Was on pureed diet the NH    - H/O right Above knee amputation.    - Rehab: Overall debility. Needs PT/OT    11) Prophylaxis: DVT    12) Directives: DNR/DNI with an extremely guarded prognosis. D/W patient's son Farzaneh Hendrickson. 13) Plan: Anticipate D/C back to The [address] [place] [address] in 3-5 days. Discussed in detail with patient's son  Tiffanie Coronel who can be reached at [phone]. D/W Nursing. Hospital Problems  Date Reviewed: 5/27/2019          Codes Class Noted POA    * (Principal) Dehydration ICD-10-CM: E86.0  ICD-9-CM: 276.51  5/27/2019 Yes        Sepsis ([facility] [address] ICD-10-CM: A41.9  ICD-9-CM: 038.9, 995.91  5/27/2019 Yes        Urinary retention ICD-10-CM: R33.9  ICD-9-CM: 788.20  5/27/2019 Yes        Hypokalemia ICD-10-CM: E87.6  ICD-9-CM: 276.8  5/27/2019 Yes        Acute cystitis without hematuria ICD-10-CM: N30.00  ICD-9-CM: 595.0  5/27/2019 Yes        Alzheimer's dementia ICD-10-CM: G30.9, F02.80  ICD-9-CM: 331.0  5/27/2019 Yes              Physical Examination:      Last 24hrs VS reviewed since prior progress note. Most recent are:  Visit Vitals  /64 (BP 1 Location: Right arm, BP Patient Position: At rest)   Pulse 90   Temp 99 °F (37.2 °C)   Resp 20   Wt 79.7 kg (175 lb 9.6 oz)   SpO2 100%   BMI 23.82 kg/m²           Constitutional: Comfortable, confused   HEENT: Head is a traumatic,  Anicteric sclera, oral mucosa moist,    Resp:  Decreased air entry Bilaterally. No rales   CV:  irregularly irregular    GI:  Soft, non distended, non tender.     : Suprapubic Yee catheter with clear urine in bag Skin  :  No erythema,rash,bullae,dipigmentation     Musculoskeletal:  R. AKA stump. 1+ edema LLE. Neurologic:  Awake, alert. Not oriented. Intake/Output Summary (Last 24 hours) at 5/28/2019 0857  Last data filed at 5/28/2019 0645  Gross per 24 hour   Intake 1812.5 ml   Output 725 ml   Net 1087.5 ml          Labs:     Recent Labs     05/28/19 0033 05/27/19 0127   WBC 9.7 14.8*   HGB 11.5* 12.5   HCT 36.8 39.7    298     Recent Labs     05/28/19 0033 05/27/19 0127   * 146*   K 3.4* 3.2*   * 111*   CO2 25 27   BUN 26* 34*   CREA 0.51* 0.89   GLU 96 117*   CA 8.2* 8.9     Recent Labs     05/27/19 0127   SGOT 35   ALT 32   AP 75   TBILI 1.1*   TP 6.8   ALB 2.7*   GLOB 4.1*     No results for input(s): INR, PTP, APTT in the last 72 hours. No lab exists for component: INREXT, INREXT   No results for input(s): FE, TIBC, PSAT, FERR in the last 72 hours. No results found for: FOL, RBCF   No results for input(s): PH, PCO2, PO2 in the last 72 hours. No results for input(s): CPK, CKNDX, TROIQ in the last 72 hours.     No lab exists for component: CPKMB  No results found for: CHOL, CHOLX, CHLST, CHOLV, HDL, LDL, LDLC, DLDLP, TGLX, TRIGL, TRIGP, CHHD, CHHDX  Lab Results   Component Value Date/Time    Glucose (POC) 102 (H) 05/27/2019 04:13 PM     Lab Results   Component Value Date/Time    Color YELLOW 05/27/2019 06:32 AM    Appearance TURBID (A) 05/27/2019 06:32 AM    Specific gravity 1.028 05/27/2019 06:32 AM    pH (UA) 6.0 05/27/2019 06:32 AM    Protein 100 (A) 05/27/2019 06:32 AM    Glucose NEGATIVE  05/27/2019 06:32 AM    Ketone NEGATIVE  05/27/2019 06:32 AM    Bilirubin NEGATIVE  05/04/2019 08:35 AM    Urobilinogen 1.0 05/27/2019 06:32 AM    Nitrites NEGATIVE  05/27/2019 06:32 AM    Leukocyte Esterase LARGE (A) 05/27/2019 06:32 AM    Epithelial cells FEW 05/27/2019 06:32 AM    Bacteria 1+ (A) 05/27/2019 06:32 AM    WBC >100 (H) 05/27/2019 06:32 AM    RBC >100 (H) 05/27/2019 06:32 AM         Medications Reviewed:     Current Facility-Administered Medications   Medication Dose Route Frequency    vancomycin (VANCOCIN) 1,000 mg in 0.9% sodium chloride (MBP/ADV) 250 mL  1,000 mg IntraVENous Q16H    potassium chloride SR (KLOR-CON 10) tablet 40 mEq  40 mEq Oral NOW    clonazePAM (KlonoPIN) tablet 0.5 mg  0.5 mg Oral QHS    donepezil (ARICEPT) tablet 5 mg  5 mg Oral DAILY    sodium chloride (NS) flush 5-40 mL  5-40 mL IntraVENous Q8H    sodium chloride (NS) flush 5-40 mL  5-40 mL IntraVENous PRN    acetaminophen (TYLENOL) tablet 650 mg  650 mg Oral Q4H PRN    cefTRIAXone (ROCEPHIN) 1 g in 0.9% sodium chloride (MBP/ADV) 50 mL  1 g IntraVENous Q24H    levothyroxine (SYNTHROID) tablet 50 mcg  50 mcg Oral 6am    amLODIPine (NORVASC) tablet 10 mg  10 mg Oral DAILY    aspirin delayed-release tablet 81 mg  81 mg Oral DAILY    clonazePAM (KlonoPIN) tablet 0.5 mg  0.5 mg Oral QHS    lactulose (CHRONULAC) 10 gram/15 mL solution 15 mL  10 g Oral TID PRN    levothyroxine (SYNTHROID) tablet 50 mcg  50 mcg Oral ACB    0.9% sodium chloride infusion  125 mL/hr IntraVENous CONTINUOUS    ondansetron (ZOFRAN) injection 4 mg  4 mg IntraVENous Q6H PRN    heparin (porcine) injection 5,000 Units  5,000 Units SubCUTAneous BID    Vancomycin Pharmacy to Dose   Other Rx Dosing/Monitoring     ______________________________________________________________________  EXPECTED LENGTH OF STAY: - - -  ACTUAL LENGTH OF STAY:          1                 Glenn Jasmine MD

## 2019-05-28 NOTE — PROGRESS NOTES
Problem: Pressure Injury - Risk of  Goal: *Prevention of pressure injury  Description  Document Arron Scale and appropriate interventions in the flowsheet. Outcome: Progressing Towards Goal    Problem: Sepsis: Day 2  Goal: *Hemodynamically stable  Outcome: Progressing Towards Goal  Goal: Respiratory  Outcome: Progressing Towards Goal     Problem: Impaired Skin Integrity/Pressure Injury Treatment  Goal: *Improvement of Existing Pressure Injury  Outcome: Progressing Towards Goal     Pt. Has stage 1 bedsore in sacrum, wound care seen the patient, transfer Pt. To specialty bed and applied ointment to the wound, wound open to air, position q2hrly. TRANSFER - OUT REPORT:    Verbal report given to Bari Connor RN(name) on Rosa Fortune  being transferred to [facility]) for routine progression of care       Report consisted of patients Situation, Background, Assessment and   Recommendations(SBAR). Information from the following report(s) SBAR, Kardex, ED Summary, Procedure Summary, Intake/Output, MAR, Accordion, Recent Results, Med Rec Status, Cardiac Rhythm V Paced and Quality Measures was reviewed with the receiving nurse. Lines:   Peripheral IV 05/27/19 Anterior; Left Hand (Active)   Site Assessment Clean, dry, & intact 5/28/2019  4:00 PM   Phlebitis Assessment 0 5/28/2019  4:00 PM   Infiltration Assessment 0 5/28/2019  4:00 PM   Dressing Status Clean, dry, & intact 5/28/2019  4:00 PM   Dressing Type Transparent;Tape 5/28/2019  4:00 PM   Hub Color/Line Status Blue; Infusing;Flushed 5/28/2019  4:00 PM   Action Taken Open ports on tubing capped 5/28/2019  4:00 PM   Alcohol Cap Used Yes 5/28/2019  4:00 PM       Peripheral IV 05/27/19 Anterior;Left;Proximal Forearm (Active)   Site Assessment Clean, dry, & intact 5/28/2019  4:00 PM   Phlebitis Assessment 0 5/28/2019  4:00 PM   Infiltration Assessment 0 5/28/2019  4:00 PM   Dressing Status Clean, dry, & intact 5/28/2019  4:00 PM   Dressing Type Tape;Transparent 5/28/2019  4:00 PM   Hub Color/Line Status Blue; Infusing;Flushed 5/28/2019  4:00 PM   Action Taken Open ports on tubing capped 5/28/2019  4:00 PM   Alcohol Cap Used Yes 5/28/2019  4:00 PM          Opportunity for questions and clarification was provided.       Patient transported with:   [employer]

## 2019-05-28 NOTE — PROGRESS NOTES
Pharmacist Note - Vancomycin Dosing    Consult provided for this 80 y.o. male for indication of bloodstream infection. Antibiotic regimen(s): ceftriaxone and vancomycin    Recent Labs     19  0127   WBC 14.8*   CREA 0.89   BUN 34*     Frequency of BMP: daily  Height: 182.9 cm  Weight: 79.8 kg  Est CrCl: ~54 ml/min  Temp (24hrs), Av °F (36.7 °C), Min:97.3 °F (36.3 °C), Max:98.7 °F (37.1 °C)    Cultures:   blood--GRAM POSITIVE COCCI IN CLUSTERS GROWING IN 1 OF 4 BOTTLES   MRSA   urine    Goal trough = 15 - 20 mcg/mL    Therapy will be initiated with a loading dose of 2000 mg IV x 1 to be followed by a maintenance dose of 1000 mg IV every 16 hours. Pharmacy to follow patient daily and order levels / make dose adjustments as appropriate.

## 2019-05-28 NOTE — PROGRESS NOTES
TRANSFER - IN REPORT:    Verbal report received from Traci Dempsey RN(name) on Aren Riggs  being received from ED(unit) for routine progression of care      Report consisted of patients Situation, Background, Assessment and   Recommendations(SBAR). Information from the following report(s) SBAR, Kardex, ED Summary, Procedure Summary, Intake/Output, MAR, Accordion, Recent Results, Med Rec Status, Cardiac Rhythm Vpaced and Alarm Parameters  was reviewed with the receiving nurse. Opportunity for questions and clarification was provided. Assessment completed upon patients arrival to unit and care assumed.      Primary Nurse Marie Sampson and Army Tone RN performed a dual skin assessment on this patient Impairment noted- see wound doc flow sheet  Arron score is 12

## 2019-05-28 NOTE — PROGRESS NOTES
Problem: Falls - Risk of  Goal: *Absence of Falls  Description  Document Al[place] Peña Fall Risk and appropriate interventions in the flowsheet. Outcome: Progressing Towards Goal     Problem: Patient Education: Go to Patient Education Activity  Goal: Patient/Family Education  Outcome: Progressing Towards Goal     Problem: Pressure Injury - Risk of  Goal: *Prevention of pressure injury  Description  Document Arron Scale and appropriate interventions in the flowsheet.   Outcome: Progressing Towards Goal     Problem: Patient Education: Go to Patient Education Activity  Goal: Patient/Family Education  Outcome: Progressing Towards Goal     Problem: Patient Education: Go to Patient Education Activity  Goal: Patient/Family Education  Outcome: Progressing Towards Goal     Problem: Sepsis: Day of Diagnosis  Goal: Off Pathway (Use only if patient is Off Pathway)  Outcome: Progressing Towards Goal  Goal: *Fluid resuscitation  Outcome: Progressing Towards Goal  Goal: *Paired blood cultures prior to first dose of antibiotic  Outcome: Progressing Towards Goal  Goal: *First dose of  appropriate antibiotic within 3 hours of arrival to ED, within 1 hour of arrival to ICU  Outcome: Progressing Towards Goal  Goal: *Lactic acid with first set of blood cultures  Outcome: Progressing Towards Goal  Goal: *Pneumococcal immunization (if eligible)  Outcome: Progressing Towards Goal  Goal: *Influenza immunization (if eligible)  Outcome: Progressing Towards Goal  Goal: Activity/Safety  Outcome: Progressing Towards Goal  Goal: Consults, if ordered  Outcome: Progressing Towards Goal  Goal: Diagnostic Test/Procedures  Outcome: Progressing Towards Goal  Goal: Nutrition/Diet  Outcome: Progressing Towards Goal  Goal: Discharge Planning  Outcome: Progressing Towards Goal  Goal: Medications  Outcome: Progressing Towards Goal  Goal: Respiratory  Outcome: Progressing Towards Goal  Goal: Treatments/Interventions/Procedures  Outcome: Progressing Towards Goal  Goal: Psychosocial  Outcome: Progressing Towards Goal     Problem: Sepsis: Day 2  Goal: Off Pathway (Use only if patient is Off Pathway)  Outcome: Progressing Towards Goal  Goal: *Central Venous Pressure maintained at 8-12 mm Hg  Outcome: Progressing Towards Goal  Goal: *Hemodynamically stable  Outcome: Progressing Towards Goal  Goal: *Tolerating diet  Outcome: Progressing Towards Goal  Goal: Activity/Safety  Outcome: Progressing Towards Goal  Goal: Consults, if ordered  Outcome: Progressing Towards Goal  Goal: Diagnostic Test/Procedures  Outcome: Progressing Towards Goal  Goal: Nutrition/Diet  Outcome: Progressing Towards Goal  Goal: Discharge Planning  Outcome: Progressing Towards Goal  Goal: Medications  Outcome: Progressing Towards Goal  Goal: Respiratory  Outcome: Progressing Towards Goal  Goal: Treatments/Interventions/Procedures  Outcome: Progressing Towards Goal  Goal: Psychosocial  Outcome: Progressing Towards Goal     Problem: Sepsis: Day 3  Goal: Off Pathway (Use only if patient is Off Pathway)  Outcome: Progressing Towards Goal  Goal: *Central Venous Pressure maintained at 8-12 mm Hg  Outcome: Progressing Towards Goal  Goal: *Oxygen saturation within defined limits  Outcome: Progressing Towards Goal  Goal: *Vital sign stability  Outcome: Progressing Towards Goal  Goal: *Tolerating diet  Outcome: Progressing Towards Goal  Goal: *Demonstrates progressive activity  Outcome: Progressing Towards Goal  Goal: Activity/Safety  Outcome: Progressing Towards Goal  Goal: Consults, if ordered  Outcome: Progressing Towards Goal  Goal: Diagnostic Test/Procedures  Outcome: Progressing Towards Goal  Goal: Nutrition/Diet  Outcome: Progressing Towards Goal  Goal: Discharge Planning  Outcome: Progressing Towards Goal  Goal: Medications  Outcome: Progressing Towards Goal  Goal: Respiratory  Outcome: Progressing Towards Goal  Goal: Treatments/Interventions/Procedures  Outcome: Progressing Towards Goal  Goal: Psychosocial  Outcome: Progressing Towards Goal     Problem: Sepsis: Day 4  Goal: Off Pathway (Use only if patient is Off Pathway)  Outcome: Progressing Towards Goal  Goal: Activity/Safety  Outcome: Progressing Towards Goal  Goal: Consults, if ordered  Outcome: Progressing Towards Goal  Goal: Diagnostic Test/Procedures  Outcome: Progressing Towards Goal  Goal: Nutrition/Diet  Outcome: Progressing Towards Goal  Goal: Discharge Planning  Outcome: Progressing Towards Goal  Goal: Medications  Outcome: Progressing Towards Goal  Goal: Respiratory  Outcome: Progressing Towards Goal  Goal: Treatments/Interventions/Procedures  Outcome: Progressing Towards Goal  Goal: Psychosocial  Outcome: Progressing Towards Goal  Goal: *Oxygen saturation within defined limits  Outcome: Progressing Towards Goal  Goal: *Hemodynamically stable  Outcome: Progressing Towards Goal  Goal: *Vital signs within defined limits  Outcome: Progressing Towards Goal  Goal: *Tolerating diet  Outcome: Progressing Towards Goal  Goal: *Demonstrates progressive activity  Outcome: Progressing Towards Goal  Goal: *Fluid volume maintenance  Outcome: Progressing Towards Goal     Problem: Sepsis: Day 5  Goal: Off Pathway (Use only if patient is Off Pathway)  Outcome: Progressing Towards Goal  Goal: *Oxygen saturation within defined limits  Outcome: Progressing Towards Goal  Goal: *Vital signs within defined limits  Outcome: Progressing Towards Goal  Goal: *Tolerating diet  Outcome: Progressing Towards Goal  Goal: *Demonstrates progressive activity  Outcome: Progressing Towards Goal  Goal: *Discharge plan identified  Outcome: Progressing Towards Goal  Goal: Activity/Safety  Outcome: Progressing Towards Goal  Goal: Consults, if ordered  Outcome: Progressing Towards Goal  Goal: Diagnostic Test/Procedures  Outcome: Progressing Towards Goal  Goal: Nutrition/Diet  Outcome: Progressing Towards Goal  Goal: Discharge Planning  Outcome: Progressing Towards Goal  Goal: Medications  Outcome: Progressing Towards Goal  Goal: Respiratory  Outcome: Progressing Towards Goal  Goal: Treatments/Interventions/Procedures  Outcome: Progressing Towards Goal  Goal: Psychosocial  Outcome: Progressing Towards Goal     Problem: Sepsis: Day 6  Goal: Off Pathway (Use only if patient is Off Pathway)  Outcome: Progressing Towards Goal  Goal: *Oxygen saturation within defined limits  Outcome: Progressing Towards Goal  Goal: *Vital signs within defined limits  Outcome: Progressing Towards Goal  Goal: *Tolerating diet  Outcome: Progressing Towards Goal  Goal: *Demonstrates progressive activity  Outcome: Progressing Towards Goal  Goal: Influenza immunization  Outcome: Progressing Towards Goal  Goal: *Pneumococcal immunization  Outcome: Progressing Towards Goal  Goal: Activity/Safety  Outcome: Progressing Towards Goal  Goal: Diagnostic Test/Procedures  Outcome: Progressing Towards Goal  Goal: Nutrition/Diet  Outcome: Progressing Towards Goal  Goal: Discharge Planning  Outcome: Progressing Towards Goal  Goal: Medications  Outcome: Progressing Towards Goal  Goal: Respiratory  Outcome: Progressing Towards Goal  Goal: Treatments/Interventions/Procedures  Outcome: Progressing Towards Goal  Goal: Psychosocial  Outcome: Progressing Towards Goal     Problem: Sepsis: Discharge Outcomes  Goal: *Vital signs within defined limits  Outcome: Progressing Towards Goal  Goal: *Tolerating diet  Outcome: Progressing Towards Goal  Goal: *Verbalizes understanding and describes prescribed diet  Outcome: Progressing Towards Goal  Goal: *Demonstrates progressive activity  Outcome: Progressing Towards Goal  Goal: *Describes follow-up/return visits to physicians  Outcome: Progressing Towards Goal  Goal: *Verbalizes name, dosage, time, side effects, and number of days to continue medications  Outcome: Progressing Towards Goal  Goal: *Influenza immunization (Oct-Mar only)  Outcome: Progressing Towards Goal  Goal: *Pneumococcal immunization  Outcome: Progressing Towards Goal  Goal: *Lungs clear or at baseline  Outcome: Progressing Towards Goal  Goal: *Oxygen saturation returns to baseline or 90% or better on room air  Outcome: Progressing Towards Goal  Goal: *Glycemic control  Outcome: Progressing Towards Goal  Goal: *Absence of deep venous thrombosis signs and symptoms(Stroke Metric)  Outcome: Progressing Towards Goal  Goal: *Describes available resources and support systems  Outcome: Progressing Towards Goal  Goal: *Optimal pain control at patient's stated goal  Outcome: Progressing Towards Goal

## 2019-05-28 NOTE — ROUTINE PROCESS
Bedside and Verbal shift change report given to Barak Humphries RN (oncoming nurse) by Abdias Farah RN (offgoing nurse). Report included the following information SBAR, Kardex, Intake/Output, MAR, Recent Results and Cardiac Rhythm NSR with PAC's.

## 2019-05-29 LAB
ANION GAP SERPL CALC-SCNC: 8 MMOL/L (ref 5–15)
BASOPHILS # BLD: 0 K/UL (ref 0–0.1)
BASOPHILS NFR BLD: 0 % (ref 0–1)
BUN SERPL-MCNC: 15 MG/DL (ref 6–20)
BUN/CREAT SERPL: 34 (ref 12–20)
CALCIUM SERPL-MCNC: 8.3 MG/DL (ref 8.5–10.1)
CHLORIDE SERPL-SCNC: 115 MMOL/L (ref 97–108)
CO2 SERPL-SCNC: 24 MMOL/L (ref 21–32)
CREAT SERPL-MCNC: 0.44 MG/DL (ref 0.7–1.3)
DIFFERENTIAL METHOD BLD: ABNORMAL
EOSINOPHIL # BLD: 0.5 K/UL (ref 0–0.4)
EOSINOPHIL NFR BLD: 6 % (ref 0–7)
ERYTHROCYTE [DISTWIDTH] IN BLOOD BY AUTOMATED COUNT: 18.6 % (ref 11.5–14.5)
GLUCOSE SERPL-MCNC: 89 MG/DL (ref 65–100)
HCT VFR BLD AUTO: 36.7 % (ref 36.6–50.3)
HGB BLD-MCNC: 11.7 G/DL (ref 12.1–17)
IMM GRANULOCYTES # BLD AUTO: 0 K/UL (ref 0–0.04)
IMM GRANULOCYTES NFR BLD AUTO: 0 % (ref 0–0.5)
LYMPHOCYTES # BLD: 2 K/UL (ref 0.8–3.5)
LYMPHOCYTES NFR BLD: 24 % (ref 12–49)
MAGNESIUM SERPL-MCNC: 1.8 MG/DL (ref 1.6–2.4)
MCH RBC QN AUTO: 30.1 PG (ref 26–34)
MCHC RBC AUTO-ENTMCNC: 31.9 G/DL (ref 30–36.5)
MCV RBC AUTO: 94.3 FL (ref 80–99)
MONOCYTES # BLD: 0.6 K/UL (ref 0–1)
MONOCYTES NFR BLD: 7 % (ref 5–13)
NEUTS SEG # BLD: 5.4 K/UL (ref 1.8–8)
NEUTS SEG NFR BLD: 63 % (ref 32–75)
NRBC # BLD: 0 K/UL (ref 0–0.01)
NRBC BLD-RTO: 0 PER 100 WBC
PHOSPHATE SERPL-MCNC: 1.6 MG/DL (ref 2.6–4.7)
PLATELET # BLD AUTO: 278 K/UL (ref 150–400)
PMV BLD AUTO: 10.3 FL (ref 8.9–12.9)
POTASSIUM SERPL-SCNC: 3 MMOL/L (ref 3.5–5.1)
PROCALCITONIN SERPL-MCNC: 0.1 NG/ML
RBC # BLD AUTO: 3.89 M/UL (ref 4.1–5.7)
SODIUM SERPL-SCNC: 147 MMOL/L (ref 136–145)
WBC # BLD AUTO: 8.5 K/UL (ref 4.1–11.1)

## 2019-05-29 PROCEDURE — 83735 ASSAY OF MAGNESIUM: CPT

## 2019-05-29 PROCEDURE — 85025 COMPLETE CBC W/AUTO DIFF WBC: CPT

## 2019-05-29 PROCEDURE — 74011000258 HC RX REV CODE- 258: Performed by: INTERNAL MEDICINE

## 2019-05-29 PROCEDURE — 65660000000 HC RM CCU STEPDOWN

## 2019-05-29 PROCEDURE — 93005 ELECTROCARDIOGRAM TRACING: CPT

## 2019-05-29 PROCEDURE — 74011250637 HC RX REV CODE- 250/637: Performed by: INTERNAL MEDICINE

## 2019-05-29 PROCEDURE — 74011250636 HC RX REV CODE- 250/636: Performed by: INTERNAL MEDICINE

## 2019-05-29 PROCEDURE — 74011250636 HC RX REV CODE- 250/636: Performed by: NURSE PRACTITIONER

## 2019-05-29 PROCEDURE — 74011000250 HC RX REV CODE- 250: Performed by: NURSE PRACTITIONER

## 2019-05-29 PROCEDURE — 80048 BASIC METABOLIC PNL TOTAL CA: CPT

## 2019-05-29 PROCEDURE — 84100 ASSAY OF PHOSPHORUS: CPT

## 2019-05-29 PROCEDURE — 92526 ORAL FUNCTION THERAPY: CPT

## 2019-05-29 PROCEDURE — 84145 PROCALCITONIN (PCT): CPT

## 2019-05-29 PROCEDURE — 36415 COLL VENOUS BLD VENIPUNCTURE: CPT

## 2019-05-29 RX ORDER — SODIUM CHLORIDE 450 MG/100ML
75 INJECTION, SOLUTION INTRAVENOUS CONTINUOUS
Status: DISCONTINUED | OUTPATIENT
Start: 2019-05-29 | End: 2019-05-30

## 2019-05-29 RX ADMIN — VANCOMYCIN HYDROCHLORIDE 1000 MG: 1 INJECTION, POWDER, LYOPHILIZED, FOR SOLUTION INTRAVENOUS at 08:37

## 2019-05-29 RX ADMIN — LEVOTHYROXINE SODIUM 50 MCG: 50 TABLET ORAL at 05:19

## 2019-05-29 RX ADMIN — CASTOR OIL AND BALSAM, PERU: 788; 87 OINTMENT TOPICAL at 21:28

## 2019-05-29 RX ADMIN — ASPIRIN 81 MG: 81 TABLET ORAL at 08:38

## 2019-05-29 RX ADMIN — HEPARIN SODIUM 5000 UNITS: 5000 INJECTION INTRAVENOUS; SUBCUTANEOUS at 08:38

## 2019-05-29 RX ADMIN — CLONAZEPAM 0.5 MG: 0.5 TABLET ORAL at 21:29

## 2019-05-29 RX ADMIN — HEPARIN SODIUM 5000 UNITS: 5000 INJECTION INTRAVENOUS; SUBCUTANEOUS at 17:44

## 2019-05-29 RX ADMIN — CASTOR OIL AND BALSAM, PERU: 788; 87 OINTMENT TOPICAL at 05:20

## 2019-05-29 RX ADMIN — Medication 10 ML: at 06:00

## 2019-05-29 RX ADMIN — ACETAMINOPHEN 650 MG: 325 TABLET ORAL at 21:29

## 2019-05-29 RX ADMIN — CEFTRIAXONE 1 G: 1 INJECTION, POWDER, FOR SOLUTION INTRAMUSCULAR; INTRAVENOUS at 17:44

## 2019-05-29 RX ADMIN — DONEPEZIL HYDROCHLORIDE 5 MG: 5 TABLET, FILM COATED ORAL at 11:54

## 2019-05-29 RX ADMIN — SODIUM CHLORIDE 75 ML/HR: 450 INJECTION, SOLUTION INTRAVENOUS at 05:21

## 2019-05-29 RX ADMIN — Medication 10 ML: at 21:29

## 2019-05-29 RX ADMIN — CASTOR OIL AND BALSAM, PERU: 788; 87 OINTMENT TOPICAL at 13:52

## 2019-05-29 RX ADMIN — Medication 10 ML: at 14:00

## 2019-05-29 RX ADMIN — SODIUM CHLORIDE: 900 INJECTION, SOLUTION INTRAVENOUS at 05:45

## 2019-05-29 RX ADMIN — AMLODIPINE BESYLATE 10 MG: 5 TABLET ORAL at 08:40

## 2019-05-29 RX ADMIN — SODIUM CHLORIDE 75 ML/HR: 450 INJECTION, SOLUTION INTRAVENOUS at 13:52

## 2019-05-29 NOTE — PROGRESS NOTES
Participated in IDR rounds this morning. Patient to continue IV antibiotics. Palliative care consulted to determine goals of care. Patient is likely to return to [facility] at discharge. CM will continue to follow and assist with discharge planning.    Miguel Perera, MSW,CRM

## 2019-05-29 NOTE — PROGRESS NOTES
Bedside shift change report given to [address] [place] [address] (oncoming nurse) by Josi Thomas RN (offgoing nurse). Report included the following information SBAR, MAR, Recent Results and Cardiac Rhythm A.fib.     0005: Monitor tech reported to me that patient had a 5 beat run on Vtach at 0001 am with a HR at 93 and that she had just noticed that earlier in the shift the patient had a 6 beat run of Vtach at 2327 pm with a HR of 97. When checking on patient, patient was in no sign of distress. Patient was not complaining of any chest pain. Pt's HR 77 and is back in A.fib. BRI Doe paged to make aware of situation. No orders were given. Will continue to monitor patient. Patient's rhythm strips placed on chart. 0105: Patient had another 6 beat run of Vtach with a HR of 102. BRI Doe paged again who stated to get an EKG and that he would come up to look at the EKG later on in the shift. Patient still stable and is showing no signs of distress. Patient has frequent PVCs which I mentioned to BRI Doe may be the cause. Will continue to monitor. 0131: Patient had another 6 beat run of Vtach with a HR of 112. 12-lead EKG om chart awaiting NP.    0132: Patient had a 5 beat run of Vtach with a HR of 94. Patient remains stable with no signs of distress. Will continue to monitor. Will continue to monitor. 0219: Patient had a 5 beat run of Vtach with a HR of 93. Patient remains stable with no signs of distress. Will continue to monitor. 0225: Patient had a 6 beat run of Vtach with a HR of 91. Patient remains stable with no signs of distress. Will continue to monitor. 0245: Patient had a 6 beat run of Vtach with a HR of 80. Patient remains stable with no signs of distress. Will continue to monitor. 0030: Patient had a 14 beat run of Vtach with a HR of 90. Patient remains stable HR now 80. Patient in room sleeping peacefully. BRI Doe paged again just to make aware that runs of Craig Mering are getting more frequent.  12 lead EKG already done and on chart. NP stated that he would come up and see patient soon. Will continue to monitor. 5206: Patient had a 10 beat run of Vtach with HR of 116. Nurse still on the phone with BRI Doe who was made aware. Patient still remains stable. Will continue to monitor. 1584: Patient had a 8 beat run of Vtach with HR in 104. BRI Doe put in lab orders for magnesium and phosphorus. Nurse called down to lab and spoke with [facility] about running this lab work being that morning labs were already sent down.  stated that they could run the new lab orders. Will await lab results and continue to monitor. 0403: Patient had a 9 beat run of Vtach with HR 90. Patient remains stable. Will continue to monitor. 0406: Patient had a 11 beat run of Vtach with HR 94. Patient remains stable. Will continue to monitor. 0408: Patient had a 13 beat run of Vtach with . Patient remains stable and is not having chest pain. Will continue to monitor. 9711: Patient had 6 beat run of Vtach with HR 79. Patient stable but just continues to have recurrent PVCs. Will continue to monitor and await mag and phos levels. 0430: Patient had a 11 beat run of Vtach with HR 92. Patient remains stable will continue to monitor. 2010: Patient had a 8 beat run of Vtach with HR 99. Patient remains stable will continue to monitor. 3970: Patient had a 9 beat run of Vtach. Patient phos level came back low at 1.6. Will make NP aware and continue to monitor. 46: Patient had a 5 beat run of Vtach with HR 99. Patient not in distress. Will continue to monitor. 8760: Patient had a 11 beat run of Vtach with HR 99. Patient not in distress. Will continue to monitor. 18: Patient had a 7 beat run of Vtach with . Patient not in distress. Will continue to monitor. 9330: Patient had a 8 beat run of Vtach with . Patient not in distress. Will continue to monitor.      0507: Patient had a 6 beat run of Vtach with . Patient not in distress. Will continue to monitor. 7510: Patient had a 6 beat run of Vtach with HR 95. Patient not in distress. Will continue to monitor. 2429: Patient had a 5 beat run of Vtach with . Patient not in distress. Will continue to monitor. 2344: Patient had a 7 beat run of Vtach. Patient not in distress. Will continue to monitor. 2209: Patient had a 12 beat run of Vtach. Patient not in distress. Will continue to monitor. 0545: Pt had a 8 beat run of Vtach with HR 92. Patient not in distress. Will continue to monitor. Pt started on IV potassium phosphate. Potassium 3.0 and Phos 1.6 this morning. Will continue to monitor. 5103: Patient had a 9 beat run of Vtach with . Patient not in distress. Will continue to monitor. 0309: Pt had a 5 beat run of Vtach with . Patient not in distress. Will continue to monitor. 0645: Patient had a 5 beat run of Vtach with HR 93. Patient remains stable will continue to monitor.

## 2019-05-29 NOTE — PROGRESS NOTES
Problem: Dysphagia (Adult)  Goal: *Acute Goals and Plan of Care (Insert Text)  Description  Initiated 5/28/2019  1. Patient will tolerate puree diet, thin liquids free of s/s of aspiration within 7 days. MET   Outcome: Resolved/Met     SPEECH LANGUAGE PATHOLOGY DYSPHAGIA TREATMENT/DISCHARGE  Patient: Caitlyn Nguyen (88 y.o. male)  Date: 5/29/2019  Diagnosis: Dehydration [E86.0] Dehydration       Precautions:       ASSESSMENT:  Patient with good tolerance of baseline diet of puree/thin liquid with no overt s/s aspiration observed. Alertness improved this date as well. Progression toward goals:  ?           Improving appropriately and progressing toward goals  ? Improving slowly and progressing toward goals  ? Not making progress toward goals and plan of care will be adjusted     PLAN:  --Continue baseline diet of puree/thin liquid  --Straws ok, small, single sips  --Meds crushed  Patient will be discharged from acute skilled speech therapy at this time. Rationale for discharge:  ?      Goals Achieved  ? Plateau Reached  ? Patient not participating in therapy  ? Other:  Discharge Recommendations:  None     SUBJECTIVE:   Patient stated his name. OBJECTIVE:   Cognitive and Communication Status:  Neurologic State: Alert, Confused  Orientation Level: Oriented to person  Cognition: Decreased command following         Perseveration: No perseveration noted       Dysphagia Treatment:     P.O. Trials:  Patient Position: upright in bed  Vocal quality prior to P.O.: Low volume  Consistency Presented:  Thin liquid;Puree  How Presented: SLP-fed/presented;Spoon;Straw;Successive swallows     Bolus Acceptance: No impairment  Bolus Formation/Control: Impaired  Type of Impairment: Delayed  Propulsion: Delayed (# of seconds)  Oral Residue: None  Initiation of Swallow: Delayed (# of seconds)  Laryngeal Elevation: Decreased  Aspiration Signs/Symptoms: None  Pharyngeal Phase Characteristics: No impairment, issues, or problems                       NOMS:   The NOMS functional outcome measure was used to quantify this patient's level of swallowing impairment. Based on the NOMS, the patient was determined to be at level 3 for swallow function     NOMS Swallowing Levels:  Level 1 (CN): NPO  Level 2 (CM): NPO but takes consistency in therapy  Level 3 (CL): Takes less than 50% of nutrition p.o. and continues with nonoral feedings; and/or safe with mod cues; and/or max diet restriction  Level 4 (CK): Safe swallow but needs mod cues; and/or mod diet restriction; and/or still requires some nonoral feeding/supplements  Level 5 (CJ): Safe swallow with min diet restriction; and/or needs min cues  Level 6 (CI): Independent with p.o.; rare cues; usually self cues; may need to avoid some foods or needs extra time  Level 7 ([address] [place] [address]): Independent for all p.o.  JANNA. (2003). National Outcomes Measurement System (NOMS): Adult Speech-Language Pathology User's Guide. Pain:  Pain Scale 1: Numeric (0 - 10)  Pain Intensity 1: 0     After treatment:   ?                Patient left in no apparent distress sitting up in chair  ? Patient left in no apparent distress in bed  ? Call bell left within reach  ? Nursing notified  ? Caregiver present  ? Bed alarm activated    COMMUNICATION/EDUCATION:     The patient?s plan of care including recommendations, planned interventions, and recommended diet changes were discussed with: Registered Nurse.     Moira Chow, SLP  Time Calculation: 8 mins

## 2019-05-29 NOTE — CONSULTS
[address]    Name:  Emilia Fox  MR#:  [id number]  :  1923  ACCOUNT #:  [de-identified]  DATE OF SERVICE:  2019      REQUESTING PHYSICIAN:  Ian Edgar MD    REASON FOR CONSULTATION:  Bacteremia. HISTORY OF PRESENT ILLNESS:  The patient is a 49-year-old man whose medical history is significant for dementia, hypertension, atrial fibrillation, hypothyroidism, TIA, and urinary retention for which he has an indwelling suprapubic catheter. He is a very poor historian, could not tell me why he came to the hospital.  According to the medical record, he was sent here because of a clogged suprapubic Lance catheter. EMS was called, and when they got there, he was wheezing with O2 saturation of 88%. There was no report of any fever, chills, or rigor. There was no report of any vomiting or abdominal pain. When I asked the patient right now, he says that he is not in any pain. He does not know why he came to the hospital.  He does not really know where he is. He was started on ceftriaxone by the emergency room. His blood cultures have grown out gram-positive cocci in two bottles from the same site, so we are being asked to see him in consult. PAST MEDICAL HISTORY:  1. Atrial fibrillation. 2.  Hypertension. 3.  Spondylosis. 4.  Hypothyroidism. 5.  History of TIA. 6.  History of urinary retention. CURRENT MEDICATIONS:  1.  Norvasc. 2.  Amlodipine. 3.  Venelex. 4.  Ceftriaxone. 5.  Klonopin. 6.  Aricept. 7.  Synthroid. 8.  Potassium. 9.  Vancomycin. ALLERGIES:  CODEINE. PAST SURGICAL HISTORY:  1.  Prostatectomy. 2.  Splenectomy. 3.  Suprapubic bladder catheter placement. 4.  Right AKA. SOCIAL HISTORY:  He is a resident of a facility. He does not smoke tobacco, drink alcohol, or engage in recreational drug use. FAMILY HISTORY:  Unobtainable. REVIEW OF SYSTEMS:  Unobtainable since he is demented.     PHYSICAL EXAMINATION:  GENERAL:  He is not in respiratory distress. VITAL SIGNS:  Temperature is 99, pulse 79, blood pressure 132/83, saturating at 97% on room air. HEENT:  He has pink conjunctivae, anicteric sclerae. External ears are normal.  NECK:  No JVD. No cervical lymphadenopathy. No carotid bruits. LUNGS:  Clear to auscultation. No rales, wheezes, or rhonchi. HEART:  Regular rate and rhythm. No murmur, rubs, or clicks. ABDOMEN:  Positive bowel sounds. The abdomen is soft, nontender. No guarding. No rebound. :  No CVA tenderness. He does not have a Lance catheter. MUSCULOSKELETAL:  Left knee, left ankle, wrists, and elbows are not warm and are not tender. He has a right AKA. INTEGUMENT:  I do not see any rash. PSYCHIATRIC:  He does not answer questions appropriately because of dementia. NEUROLOGIC:  He does have spontaneous movements of his extremities. LABORATORY DATA:  White blood cell count 14.8, which has come down to 9.7; hemoglobin 11.5; platelet 862. Urinalysis shows greater than 100 white blood cells and red blood cells. Creatinine 0.51. Lactic acid has come down from 2.3 to 1.7. AST 12, ALT 12, alk phos 74, total bilirubin 1.1. Chest x-ray shows clear lungs. IMPRESSION:  1.  Gram-positive cocci bacteremia in two out of four bottles from the same site. 2.  Pyuria. 3.  Dementia. 4.  Atrial fibrillation. 5.  Hypothyroidism. PLAN:  The significance of the gram-positive cocci in the blood is unknown as of now. It only has grown out from a single site. If this turns out to be coag-negative staph, then I would think that this would be a contaminant. If this turns out to be MRSA, then he will need further workup if the family desires. For now, let us continue ceftriaxone and vancomycin. Thank you for the consult.       MD PURNIMA Gardner/[email or web address]/  D:  05/28/2019 17:51  T:  05/28/2019 20:23  JOB #:  [id number]  CC:

## 2019-05-29 NOTE — PROGRESS NOTES
Problem: Falls - Risk of  Goal: *Absence of Falls  Description  Document Domingo Luke Fall Risk and appropriate interventions in the flowsheet. Outcome: Progressing Towards Goal  Note:   Fall Risk Interventions:  Mobility Interventions: Communicate number of staff needed for ambulation/transfer, Patient to call before getting OOB, PT Consult for mobility concerns    Mentation Interventions: Adequate sleep, hydration, pain control, Door open when patient unattended, Bed/chair exit alarm, More frequent rounding    Medication Interventions: Bed/chair exit alarm, Patient to call before getting OOB, Teach patient to arise slowly    Elimination Interventions: Call light in reach, Patient to call for help with toileting needs, Toileting schedule/hourly rounds, Bed/chair exit alarm    History of Falls Interventions: Bed/chair exit alarm, Room close to nurse's station, Door open when patient unattended         Problem: Pressure Injury - Risk of  Goal: *Prevention of pressure injury  Description  Document Arron Scale and appropriate interventions in the flowsheet.   Outcome: Progressing Towards Goal  Note:   Pressure Injury Interventions:  Sensory Interventions: Assess changes in LOC, Keep linens dry and wrinkle-free, Maintain/enhance activity level, Minimize linen layers, Check visual cues for pain    Moisture Interventions: Absorbent underpads, Apply protective barrier, creams and emollients, Check for incontinence Q2 hours and as needed, Internal/External urinary devices    Activity Interventions: Pressure redistribution bed/mattress(bed type), Increase time out of bed    Mobility Interventions: HOB 30 degrees or less, Pressure redistribution bed/mattress (bed type)    Nutrition Interventions: Document food/fluid/supplement intake, Offer support with meals,snacks and hydration    Friction and Shear Interventions: HOB 30 degrees or less, Lift team/patient mobility team, Lift sheet, Minimize layers                Problem: Sepsis: Day 3  Goal: *Oxygen saturation within defined limits  Outcome: Progressing Towards Goal     Problem: Sepsis: Day 3  Goal: *Vital sign stability  Outcome: Progressing Towards Goal

## 2019-05-29 NOTE — PROGRESS NOTES
Problem: Falls - Risk of  Goal: *Absence of Falls  Description  Document Brooke Fransiscoderick Fall Risk and appropriate interventions in the flowsheet. Outcome: Progressing Towards Goal     Problem: Pressure Injury - Risk of  Goal: *Prevention of pressure injury  Description  Document Arron Scale and appropriate interventions in the flowsheet.   Outcome: Progressing Towards Goal     Problem: Sepsis: Day 3  Goal: Activity/Safety  Outcome: Progressing Towards Goal  Goal: Consults, if ordered  Outcome: Progressing Towards Goal  Goal: Diagnostic Test/Procedures  Outcome: Progressing Towards Goal  Goal: Medications  Outcome: Progressing Towards Goal

## 2019-05-29 NOTE — PROGRESS NOTES
Bedside and Verbal shift change report given to Ruby Zuniga RN (oncoming nurse) by Shyanne Perez RN (offgoing nurse). Report included the following information SBAR, Kardex, MAR, Recent Results and Afib.

## 2019-05-29 NOTE — PROGRESS NOTES
Consult received and discussed with Dr. Joel Chou. Assessed pt and called son Dr. Hansen Friday. Set up a time to talk with him and his sister Damaris Littlejohn at 1 pm tomorrow. Plan is to review care goals in light of recent frequent ED visits.       Andrés Deleon MD  Palliative medicine

## 2019-05-29 NOTE — PROGRESS NOTES
Spiritual Care Assessment/Progress Note  [facility]      NAME: Honor Libman      MRN: [id number]  AGE: 80 y.o.  SEX: male  Mormonism Affiliation: Non Alevism   Language: English     5/29/2019     Total Time (in minutes): 12     Spiritual Assessment begun in [address] through conversation with:         [x]Patient        [x] Family    [] Friend(s)        Reason for Consult: Palliative Care, Initial/Spiritual Assessment     Spiritual beliefs: (Please include comment if needed)     [x] Identifies with a tea tradition:         [] Supported by a tea community:            [] Claims no spiritual orientation:           [] Seeking spiritual identity:                [] Adheres to an individual form of spirituality:           [] Not able to assess:                           Identified resources for coping:      [] Prayer                               [] Music                  [] Guided Imagery     [x] Family/friends                 [] Pet visits     [] Devotional reading                         [] Unknown     [] Other:                                               Interventions offered during this visit: (See comments for more details)    Patient Interventions: Initial/Spiritual assessment, patient floor, Initial visit, Affirmation of tea, Affirmation of emotions/emotional suffering, Normalization of emotional/spiritual concerns     Family/Friend(s): Initial Assessment, Affirmation of tea, Affirmation of emotions/emotional suffering, Normalization of emotional/spiritual concerns     Plan of Care:     [x] Support spiritual and/or cultural needs    [] Support AMD and/or advance care planning process      [] Support grieving process   [] Coordinate Rites and/or Rituals    [] Coordination with community clergy   [] No spiritual needs identified at this time   [] Detailed Plan of Care below (See Comments)  [] Make referral to Music Therapy  [] Make referral to Pet Therapy     [] Make referral to Addiction services  [] Make referral to [facility]  [] Make referral to Spiritual Care Partner  [] No future visits requested        [x] Follow up visits as needed     Comments: Sksoy Alva made initial visit to patients room in Neuro [facility]. Patients daughter was in the room with the patient when  entered. Daughter told  that patient has dementia and not very responsive at times. Daughter was trying to help patient eat. Patient was a resident at the [facility] when he was admitted to hospital. Daughter said there were 2 other sons and one lives out of state and the other son, Tod Griffith is the MPOA for the patient.  provided pastoral care and support to the patient during this visit. Spiritual Care will follow up as needed and able.       Parvez Rojas MDiv  Pager: [phone]

## 2019-05-29 NOTE — PROGRESS NOTES
56: Nurse received a call from the [facility] asking for the patient's diagnose and when he would be returning back. Nurse stated that it was not a good time to all about discharge and that we could not speak about patient's medical information over the phone. Will pass along to day shift nurse and continue to monitor.

## 2019-05-29 NOTE — PROGRESS NOTES
ID Progress Note  2019    Subjective:     Afebrile. No abdominal pain, headache, sore throat, chest pain. Objective:     Vitals:   Visit Vitals  /75 (BP 1 Location: Right arm, BP Patient Position: At rest)   Pulse 80   Temp 97.2 °F (36.2 °C)   Resp 15   Wt 81.8 kg (180 lb 4.8 oz)   SpO2 98%   BMI 24.45 kg/m²        Tmax:  Temp (24hrs), Av.7 °F (36.5 °C), Min:96.9 °F (36.1 °C), Max:98.4 °F (36.9 °C)      Exam:    Not in distress  Eyes: pink conjunctivae, anicteric sclerae   Lungs: clear to auscultation, no rales, wheezes or rhonchi   Heart: s1, s2, no murmurs, rubs or clicks   Abdomen: soft, nontender, no guarding or rebound   Extremities: right AKA  No cervical lymphadenopathy   Some speech is garbled     Labs:   Lab Results   Component Value Date/Time    WBC 8.5 2019 01:21 AM    HGB 11.7 (L) 2019 01:21 AM    HCT 36.7 2019 01:21 AM    PLATELET 690  01:21 AM    MCV 94.3 2019 01:21 AM     Lab Results   Component Value Date/Time    Sodium 147 (H) 2019 01:21 AM    Potassium 3.0 (L) 2019 01:21 AM    Chloride 115 (H) 2019 01:21 AM    CO2 24 2019 01:21 AM    Anion gap 8 2019 01:21 AM    Glucose 89 2019 01:21 AM    BUN 15 2019 01:21 AM    Creatinine 0.44 (L) 2019 01:21 AM    BUN/Creatinine ratio 34 (H) 2019 01:21 AM    GFR est AA >60 2019 01:21 AM    GFR est non-AA >60 2019 01:21 AM    Calcium 8.3 (L) 2019 01:21 AM    Bilirubin, total 1.1 (H) 2019 01:27 AM    AST (SGOT) 35 2019 01:27 AM    Alk. phosphatase 75 2019 01:27 AM    Protein, total 6.8 2019 01:27 AM    Albumin 2.7 (L) 2019 01:27 AM    Globulin 4.1 (H) 2019 01:27 AM    A-G Ratio 0.7 (L) 2019 01:27 AM    ALT (SGPT) 32 2019 01:27 AM         Assessment:       1. Coag neg staph in two out of four bottles from the same site. 2.  Pyuria. 3.  Dementia. 4.  Atrial fibrillation.   5. Hypothyroidism. Recommendations:     Two bottles from the same site are growing coag neg staph. This likely represents contamination. Discontinue vanco.     Should treat the proteus that grew out from the urine culture on 5/22.      Claire Taveras MD

## 2019-05-29 NOTE — PROGRESS NOTES
Asked to check SPT for drainage and need for change. SPT is draining well and was last changed at [facility] on 5/15/19. Pt had appt at [facility] with Dr. Vy Quintanilla on May 30, 2019 8 am.  I cancelled appt as it appears pt will still be hospitalized. No spt exchanges are scheduled at this time. The [facility] staff will need to reschedule the May 30 appt and schedule SPT change after discharge.     I included this info on the discharge summary    [facility] RN  [phone]

## 2019-05-29 NOTE — PROGRESS NOTES
Bedside shift change report given to [facility] (oncoming nurse) by Yaneth Mars RN (offgoing nurse). Report included the following information SBAR, MAR, Recent Results and Cardiac Rhythm Chronic A.fib.

## 2019-05-29 NOTE — PROGRESS NOTES
Hospitalist Progress Note                               Jasson Ospina MD                                     Answering service: [phone]                               [phone] from in house phone                                         Date of Service:  2019  NAME:  Cayla Houser  :  1923  MRN:  [id number]      Admission Summary:     42-year-old gentleman with a PMH significant for dementia, hypertension, Afib and off chronic anticoagulation, hypothyroidism, TIA, urinary retention, right AKA, and debility [facility] from [facility] for blocked suprapubic Lance catheter. Reason for follow up:       CC: Blocked Suprapubic Lance catheter. Patient seen and examined by the bedside  Labs, images and notes reviewed  Overnight events reviewed with the nursing staff, pt had episodes of NSVT, resolved now after potassium and phos supplementation. Patient appears more awake and alert, comfortable, afebrile. Eating well with assist from daughter. Still gets bouts of confusion. Discussed the CONS in blood cx with daughter, Taurus Naqvi has been stopped by ID. Cont Ceftriaxone for proteus in the urine. DW family regarding needs for further care decisions, daughter agreed to talk to palliative care team.  No N/V/D/CP/AP  Discussed with nursing staff, no acute issues overnight, orders reviewed. Assessment & Plan:     - Chronic Suprapubic Lance catheter for urinary retention with resolving blockage. Urology consult noted    - Severe sepsis due to UTI associated with chronic suprapubic catheter presence (recurrent UTI ) and with End-organ dysfunction including Hypotension and Lactic acidemia all present on admission.   Resolving now  Afebrile, leukocytosis resolved  BC x 2 show CoNS, Olinda Max stopped by ID  Urine cx  shows proteus, cont ceftriaxone for that  Repeat BC x 2: NGTD  ID consult appreciated    - Hypernatremia  Poor due to poor oral intake  Continue IVFs. -NSVT  Resolved, likely due to electrolyte abnormalities  Received potassium and phos supplementation  Cont monitored bed    -Acute multifactorial metabolic Encephalopathy. POA  On top of Probable Advanced Alzheimer's Dementia. H/O TIA. Acute encephalopathy is improving  Palliative care consult    - Primary Hypertension. Chronic Afib. (Off chronic anticoagulation). controlled    - Hypokalemia and hypophosphatemia. Replete, repeat BMP in am.    - Hypothyroidism. Cont home meds    - Dysphagia: Pureed diet    - H/O right Above knee amputation.    - Rehab: Overall debility. Needs PT/OT    -  Prophylaxis: DVT    - Directives: DNR/DNI with an extremely guarded prognosis. D/W patient's daughter Delia Zayas    - Plan: Anticipate D/C back to The [address] [place] [address] in 3-5 days. Dr Miriam Guzman who can be reached at [phone]. D/W Nursing. Hospital Problems  Date Reviewed: 5/27/2019          Codes Class Noted POA    * (Principal) Dehydration ICD-10-CM: E86.0  ICD-9-CM: 276.51  5/27/2019 Yes        Sepsis ([facility] [address] ICD-10-CM: A41.9  ICD-9-CM: 038.9, 995.91  5/27/2019 Yes        Urinary retention ICD-10-CM: R33.9  ICD-9-CM: 788.20  5/27/2019 Yes        Hypokalemia ICD-10-CM: E87.6  ICD-9-CM: 276.8  5/27/2019 Yes        Acute cystitis without hematuria ICD-10-CM: N30.00  ICD-9-CM: 595.0  5/27/2019 Yes        Alzheimer's dementia ICD-10-CM: G30.9, F02.80  ICD-9-CM: 331.0  5/27/2019 Yes              Physical Examination:      Last 24hrs VS reviewed since prior progress note. Most recent are:  Visit Vitals  BP 98/63 (BP 1 Location: Right arm, BP Patient Position: At rest)   Pulse 79   Temp 97.4 °F (36.3 °C)   Resp 20   Ht 6' (1.829 m)   Wt 81.8 kg (180 lb 4.8 oz)   SpO2 96%   BMI 24.45 kg/m²           Constitutional: Comfortable, awake, alert but oriented just to self. HEENT: Head is a traumatic,  Anicteric sclera, oral mucosa moist,    Resp:  Decreased air entry Bilaterally.  No rales   CV: irregularly irregular    GI:  Soft, non distended, non tender. : Suprapubic Lance catheter with clear urine in bag   Skin  :  No erythema,rash,bullae,dipigmentation     Musculoskeletal:  R. AKA stump. 1+ edema LLE. Neurologic:  Awake, alert. Not oriented. Intake/Output Summary (Last 24 hours) at 5/29/2019 1357  Last data filed at 5/29/2019 1003  Gross per 24 hour   Intake 1698.75 ml   Output 1575 ml   Net 123.75 ml          Labs:     Recent Labs     05/29/19 0121 05/28/19 0033   WBC 8.5 9.7   HGB 11.7* 11.5*   HCT 36.7 36.8    265     Recent Labs     05/29/19 0121 05/28/19 0033 05/27/19 0127   * 149* 146*   K 3.0* 3.4* 3.2*   * 116* 111*   CO2 24 25 27   BUN 15 26* 34*   CREA 0.44* 0.51* 0.89   GLU 89 96 117*   CA 8.3* 8.2* 8.9   MG 1.8  --   --    PHOS 1.6*  --   --      Recent Labs     05/27/19 0127   SGOT 35   ALT 32   AP 75   TBILI 1.1*   TP 6.8   ALB 2.7*   GLOB 4.1*     No results for input(s): INR, PTP, APTT in the last 72 hours. No lab exists for component: INREXT, INREXT   No results for input(s): FE, TIBC, PSAT, FERR in the last 72 hours. No results found for: FOL, RBCF   No results for input(s): PH, PCO2, PO2 in the last 72 hours. No results for input(s): CPK, CKNDX, TROIQ in the last 72 hours.     No lab exists for component: CPKMB  No results found for: CHOL, CHOLX, CHLST, CHOLV, HDL, LDL, LDLC, DLDLP, TGLX, TRIGL, TRIGP, CHHD, CHHDX  Lab Results   Component Value Date/Time    Glucose (POC) 102 (H) 05/27/2019 04:13 PM     Lab Results   Component Value Date/Time    Color YELLOW 05/27/2019 06:32 AM    Appearance TURBID (A) 05/27/2019 06:32 AM    Specific gravity 1.028 05/27/2019 06:32 AM    pH (UA) 6.0 05/27/2019 06:32 AM    Protein 100 (A) 05/27/2019 06:32 AM    Glucose NEGATIVE  05/27/2019 06:32 AM    Ketone NEGATIVE  05/27/2019 06:32 AM    Bilirubin NEGATIVE  05/04/2019 08:35 AM    Urobilinogen 1.0 05/27/2019 06:32 AM    Nitrites NEGATIVE  05/27/2019 06:32 AM    Leukocyte Esterase LARGE (A) 05/27/2019 06:32 AM    Epithelial cells FEW 05/27/2019 06:32 AM    Bacteria 1+ (A) 05/27/2019 06:32 AM    WBC >100 (H) 05/27/2019 06:32 AM    RBC >100 (H) 05/27/2019 06:32 AM         Medications Reviewed:     Current Facility-Administered Medications   Medication Dose Route Frequency    phosphorus (K PHOS NEUTRAL) 250 mg tablet 1 Tab  1 Tab Oral BID    0.45% sodium chloride infusion  75 mL/hr IntraVENous CONTINUOUS    balsam peru-castor oil (VENELEX) ointment   Topical Q8H    donepezil (ARICEPT) tablet 5 mg  5 mg Oral DAILY    sodium chloride (NS) flush 5-40 mL  5-40 mL IntraVENous Q8H    sodium chloride (NS) flush 5-40 mL  5-40 mL IntraVENous PRN    acetaminophen (TYLENOL) tablet 650 mg  650 mg Oral Q4H PRN    cefTRIAXone (ROCEPHIN) 1 g in 0.9% sodium chloride (MBP/ADV) 50 mL  1 g IntraVENous Q24H    levothyroxine (SYNTHROID) tablet 50 mcg  50 mcg Oral 6am    amLODIPine (NORVASC) tablet 10 mg  10 mg Oral DAILY    aspirin delayed-release tablet 81 mg  81 mg Oral DAILY    clonazePAM (KlonoPIN) tablet 0.5 mg  0.5 mg Oral QHS    lactulose (CHRONULAC) 10 gram/15 mL solution 15 mL  10 g Oral TID PRN    levothyroxine (SYNTHROID) tablet 50 mcg  50 mcg Oral ACB    ondansetron (ZOFRAN) injection 4 mg  4 mg IntraVENous Q6H PRN    heparin (porcine) injection 5,000 Units  5,000 Units SubCUTAneous BID     ______________________________________________________________________  EXPECTED LENGTH OF STAY: 4d 19h  ACTUAL LENGTH OF STAY:          2                 Niyah Boyce MD

## 2019-05-29 NOTE — PROGRESS NOTES
NUTRITION   RD Assessment       Pt seen for:    [x] BPA - pressure ulcer POA []   MD Consult     [] Supplements  []   PO intake check   [] Food Allergies  []   Food Preferences/tolerances    [] Rescreen   []   Education    [] Diet order clarification []   Other   [] LOS                          Nutrition Prescription:     No changes or new recommendations at this time  Encourage adequate intake of meals and supplements  RD to add Ensure Compact to meals     Assessment:   Information obtained from:   RN, PCT & medical records     Pt admitted with Dehydration [E86.0]. PMHx: dementia, hypertension, AFib, hypothyroidism, TIA, urinary retention. Pt was fed b'fast by PCT & she reports he ate very well. Pt with skin break down on admission will benefit from added nutrition. Pt with R AKA. Cultural, Anabaptism and ethnic food preferences:   [x]  None   []  Identified and addressed    Diet:  Pureed, thin liquids    PO Intake: [x] Good     [] Fair  [] Poor   Intake documented from Nursing flow sheets:   Patient Vitals for the past 100 hrs:   % Diet Eaten   05/29/19 1003 85 %       Skin: Wound (add Wound LDA)  BM:   PTA       ABD:  WDL     Bowel Sounds last documented:  n/a     Estimated Daily Nutrition Requirements:  Kcals/day: 1640 Kcals/day  Protein: 82 g( - 98g (1 - 1.2g/kg of current wt)  Fluid: (1mL/kcal of PO)     Based On: Diana Llanes(AF 1.1)  Weight Used: Actual wt(81.8 kg)  Body mass index is 24.45 kg/m². Weight Changes:   []   Loss  []   Gain  [x]   Stable  Wt Readings from Last 5 Encounters:   05/29/19 81.8 kg (180 lb 4.8 oz)   05/22/19 82.6 kg (182 lb)   05/04/19 82.6 kg (182 lb 1.6 oz)   04/10/19 85.7 kg (189 lb)   03/07/19 80.7 kg (178 lb)       Nutrition Problems Identified  [x]     None  []     Food Preferences     Nutrition Diagnosis:      Increased nutrient need related to protein as evidence by skin breakdown POA.      Intervention:   []    Obtained/adjusted food preferences/tolerances and/or snacks options   []    Dislikes supplements will try a substitution   []    Modify diet for food allergies  []    Adjust texture due to difficulty chewing   []    Encourage Fresh Fruit, Activia yogurt, fluid   []    Educated patient  [x]    Rescreen per screening protocol  [x]    Add Supplements    Goal: no new  Met previous goal: n/a   Monitoring/Evaluation:   Education & Discharge Needs  [] Nutrition related discharge needs addressed:   [x] Supplements (on d/c instruction &/or coupons provided)    [] Education   [x] No nutrition related discharge needs at this time    Rescreen: []  At Nutrition Risk          [x]  Not at Nutrition Risk, rescreen per screening protocol    William Shelton MS, [address], [address]   Pager #[phone]

## 2019-05-29 NOTE — CDMP QUERY
Patient admitted with sepsis, noted to have elevated Na level. If possible, please document in progress notes and d/c summary if you are evaluating and/or treating any of the following: 
 
 
=>  Hypernatremia (POA) 
=>  Other Explanation of clinical findings 
=>  Clinically Undetermined (no explanation for clinical findings) The medical record reflects the following: 
 
   Risk Factors: Sepsis, treated per sepsis protocol-NS bolus in ED Clinical Indicators: Na level 149 and 137 since admission Treatment: IVF changed from 0.9% to 0.45% NS, lab monitoring Thank you, Casie Hansen, RN, MSN, [facility], [address] [place] 
[phone]

## 2019-05-30 LAB
ANION GAP SERPL CALC-SCNC: 6 MMOL/L (ref 5–15)
ATRIAL RATE: 47 BPM
BASOPHILS # BLD: 0 K/UL (ref 0–0.1)
BASOPHILS NFR BLD: 0 % (ref 0–1)
BUN SERPL-MCNC: 8 MG/DL (ref 6–20)
BUN/CREAT SERPL: 15 (ref 12–20)
CALCIUM SERPL-MCNC: 8.5 MG/DL (ref 8.5–10.1)
CALCULATED R AXIS, ECG10: -79 DEGREES
CALCULATED T AXIS, ECG11: -49 DEGREES
CHLORIDE SERPL-SCNC: 113 MMOL/L (ref 97–108)
CO2 SERPL-SCNC: 26 MMOL/L (ref 21–32)
CREAT SERPL-MCNC: 0.54 MG/DL (ref 0.7–1.3)
DIAGNOSIS, 93000: NORMAL
DIFFERENTIAL METHOD BLD: ABNORMAL
EOSINOPHIL # BLD: 0.5 K/UL (ref 0–0.4)
EOSINOPHIL NFR BLD: 7 % (ref 0–7)
ERYTHROCYTE [DISTWIDTH] IN BLOOD BY AUTOMATED COUNT: 18.5 % (ref 11.5–14.5)
GLUCOSE SERPL-MCNC: 91 MG/DL (ref 65–100)
HCT VFR BLD AUTO: 35.5 % (ref 36.6–50.3)
HGB BLD-MCNC: 11.4 G/DL (ref 12.1–17)
IMM GRANULOCYTES # BLD AUTO: 0 K/UL (ref 0–0.04)
IMM GRANULOCYTES NFR BLD AUTO: 0 % (ref 0–0.5)
LYMPHOCYTES # BLD: 2.4 K/UL (ref 0.8–3.5)
LYMPHOCYTES NFR BLD: 33 % (ref 12–49)
MCH RBC QN AUTO: 30.1 PG (ref 26–34)
MCHC RBC AUTO-ENTMCNC: 32.1 G/DL (ref 30–36.5)
MCV RBC AUTO: 93.7 FL (ref 80–99)
MONOCYTES # BLD: 0.5 K/UL (ref 0–1)
MONOCYTES NFR BLD: 7 % (ref 5–13)
NEUTS SEG # BLD: 3.7 K/UL (ref 1.8–8)
NEUTS SEG NFR BLD: 53 % (ref 32–75)
NRBC # BLD: 0 K/UL (ref 0–0.01)
NRBC BLD-RTO: 0 PER 100 WBC
PHOSPHATE SERPL-MCNC: 2.8 MG/DL (ref 2.6–4.7)
PLATELET # BLD AUTO: 286 K/UL (ref 150–400)
PMV BLD AUTO: 10 FL (ref 8.9–12.9)
POTASSIUM SERPL-SCNC: 2.9 MMOL/L (ref 3.5–5.1)
Q-T INTERVAL, ECG07: 380 MS
QRS DURATION, ECG06: 82 MS
QTC CALCULATION (BEZET), ECG08: 427 MS
RBC # BLD AUTO: 3.79 M/UL (ref 4.1–5.7)
SODIUM SERPL-SCNC: 145 MMOL/L (ref 136–145)
VENTRICULAR RATE, ECG03: 76 BPM
WBC # BLD AUTO: 7.1 K/UL (ref 4.1–11.1)

## 2019-05-30 PROCEDURE — 65270000029 HC RM PRIVATE

## 2019-05-30 PROCEDURE — 80048 BASIC METABOLIC PNL TOTAL CA: CPT

## 2019-05-30 PROCEDURE — 36415 COLL VENOUS BLD VENIPUNCTURE: CPT

## 2019-05-30 PROCEDURE — 74011250637 HC RX REV CODE- 250/637: Performed by: INTERNAL MEDICINE

## 2019-05-30 PROCEDURE — 74011250636 HC RX REV CODE- 250/636: Performed by: INTERNAL MEDICINE

## 2019-05-30 PROCEDURE — 84100 ASSAY OF PHOSPHORUS: CPT

## 2019-05-30 PROCEDURE — 76450000000

## 2019-05-30 PROCEDURE — 74011000258 HC RX REV CODE- 258: Performed by: INTERNAL MEDICINE

## 2019-05-30 PROCEDURE — 85025 COMPLETE CBC W/AUTO DIFF WBC: CPT

## 2019-05-30 PROCEDURE — 77010033678 HC OXYGEN DAILY

## 2019-05-30 RX ORDER — POTASSIUM CHLORIDE 14.9 MG/ML
10 INJECTION INTRAVENOUS
Status: COMPLETED | OUTPATIENT
Start: 2019-05-30 | End: 2019-05-30

## 2019-05-30 RX ORDER — CLONAZEPAM 0.5 MG/1
0.5 TABLET ORAL
Status: DISCONTINUED | OUTPATIENT
Start: 2019-05-30 | End: 2019-06-01 | Stop reason: HOSPADM

## 2019-05-30 RX ADMIN — CASTOR OIL AND BALSAM, PERU: 788; 87 OINTMENT TOPICAL at 13:58

## 2019-05-30 RX ADMIN — HEPARIN SODIUM 5000 UNITS: 5000 INJECTION INTRAVENOUS; SUBCUTANEOUS at 18:41

## 2019-05-30 RX ADMIN — DONEPEZIL HYDROCHLORIDE 5 MG: 5 TABLET, FILM COATED ORAL at 10:32

## 2019-05-30 RX ADMIN — POTASSIUM CHLORIDE 10 MEQ: 200 INJECTION, SOLUTION INTRAVENOUS at 16:51

## 2019-05-30 RX ADMIN — Medication 10 ML: at 14:00

## 2019-05-30 RX ADMIN — POTASSIUM CHLORIDE 10 MEQ: 200 INJECTION, SOLUTION INTRAVENOUS at 17:54

## 2019-05-30 RX ADMIN — CASTOR OIL AND BALSAM, PERU: 788; 87 OINTMENT TOPICAL at 06:13

## 2019-05-30 RX ADMIN — CASTOR OIL AND BALSAM, PERU: 788; 87 OINTMENT TOPICAL at 22:24

## 2019-05-30 RX ADMIN — POTASSIUM CHLORIDE 10 MEQ: 200 INJECTION, SOLUTION INTRAVENOUS at 19:05

## 2019-05-30 RX ADMIN — Medication 10 ML: at 06:13

## 2019-05-30 RX ADMIN — ASPIRIN 81 MG: 81 TABLET ORAL at 10:32

## 2019-05-30 RX ADMIN — Medication 10 ML: at 22:25

## 2019-05-30 RX ADMIN — LEVOTHYROXINE SODIUM 50 MCG: 25 TABLET ORAL at 10:32

## 2019-05-30 RX ADMIN — AMLODIPINE BESYLATE 10 MG: 5 TABLET ORAL at 10:32

## 2019-05-30 RX ADMIN — POTASSIUM CHLORIDE 10 MEQ: 200 INJECTION, SOLUTION INTRAVENOUS at 15:47

## 2019-05-30 RX ADMIN — CEFTRIAXONE 1 G: 1 INJECTION, POWDER, FOR SOLUTION INTRAMUSCULAR; INTRAVENOUS at 18:41

## 2019-05-30 RX ADMIN — DIBASIC SODIUM PHOSPHATE, MONOBASIC POTASSIUM PHOSPHATE AND MONOBASIC SODIUM PHOSPHATE 1 TABLET: 852; 155; 130 TABLET ORAL at 18:40

## 2019-05-30 RX ADMIN — DIBASIC SODIUM PHOSPHATE, MONOBASIC POTASSIUM PHOSPHATE AND MONOBASIC SODIUM PHOSPHATE 1 TABLET: 852; 155; 130 TABLET ORAL at 10:32

## 2019-05-30 RX ADMIN — HEPARIN SODIUM 5000 UNITS: 5000 INJECTION INTRAVENOUS; SUBCUTANEOUS at 10:32

## 2019-05-30 NOTE — PROGRESS NOTES
Problem: Falls - Risk of  Goal: *Absence of Falls  Description  Document Ingris Bullard Fall Risk and appropriate interventions in the flowsheet. Outcome: Progressing Towards Goal  Note:   Fall Risk Interventions:  Mobility Interventions: Bed/chair exit alarm    Mentation Interventions: Adequate sleep, hydration, pain control    Medication Interventions: Bed/chair exit alarm    Elimination Interventions: Call light in reach    History of Falls Interventions: Bed/chair exit alarm         Problem: Patient Education: Go to Patient Education Activity  Goal: Patient/Family Education  Outcome: Progressing Towards Goal     Problem: Pressure Injury - Risk of  Goal: *Prevention of pressure injury  Description  Document Arron Scale and appropriate interventions in the flowsheet.   Outcome: Progressing Towards Goal     Problem: Patient Education: Go to Patient Education Activity  Goal: Patient/Family Education  Outcome: Progressing Towards Goal     Problem: Patient Education: Go to Patient Education Activity  Goal: Patient/Family Education  Outcome: Progressing Towards Goal     Problem: Sepsis: Day 2  Goal: *Central Venous Pressure maintained at 8-12 mm Hg  Outcome: Progressing Towards Goal  Goal: *Hemodynamically stable  Outcome: Progressing Towards Goal  Goal: *Tolerating diet  Outcome: Progressing Towards Goal  Goal: Activity/Safety  Outcome: Progressing Towards Goal  Goal: Consults, if ordered  Outcome: Progressing Towards Goal  Goal: Diagnostic Test/Procedures  Outcome: Progressing Towards Goal  Goal: Nutrition/Diet  Outcome: Progressing Towards Goal  Goal: Discharge Planning  Outcome: Progressing Towards Goal  Goal: Medications  Outcome: Progressing Towards Goal  Goal: Respiratory  Outcome: Progressing Towards Goal  Goal: Treatments/Interventions/Procedures  Outcome: Progressing Towards Goal  Goal: Psychosocial  Outcome: Progressing Towards Goal     Problem: Infection - Risk of, Urinary Catheter-Associated Urinary Tract Infection  Goal: *Absence of infection signs and symptoms  Outcome: Progressing Towards Goal     Problem: Patient Education: Go to Patient Education Activity  Goal: Patient/Family Education  Outcome: Progressing Towards Goal     Problem: Risk for Spread of Infection  Goal: Prevent transmission of infectious organism to others  Description  Prevent the transmission of infectious organisms to other patients, staff members, and visitors. Outcome: Progressing Towards Goal     Problem: Patient Education:  Go to Education Activity  Goal: Patient/Family Education  Outcome: Progressing Towards Goal     Problem: Impaired Skin Integrity/Pressure Injury Treatment  Goal: *Improvement of Existing Pressure Injury  Outcome: Progressing Towards Goal  Goal: *Prevention of pressure injury  Description  Document Arron Scale and appropriate interventions in the flowsheet.   Outcome: Progressing Towards Goal     Problem: Patient Education: Go to Patient Education Activity  Goal: Patient/Family Education  Outcome: Progressing Towards Goal     Problem: Patient Education: Go to Patient Education Activity  Goal: Patient/Family Education  Outcome: Progressing Towards Goal     Problem: Nutrition Deficit  Goal: *Optimize nutritional status  Outcome: Progressing Towards Goal

## 2019-05-30 NOTE — PROGRESS NOTES
ID Progress Note  2019    Subjective:     Afebrile. Sleepy. Objective:     Vitals:   Visit Vitals  /78 (BP 1 Location: Left leg, BP Patient Position: At rest)   Pulse 79   Temp 97.8 °F (36.6 °C)   Resp 15   Ht 6' (1.829 m)   Wt 83.1 kg (183 lb 3.2 oz)   SpO2 97%   BMI 24.85 kg/m²        Tmax:  Temp (24hrs), Av °F (36.7 °C), Min:97.2 °F (36.2 °C), Max:98.6 °F (37 °C)      Exam:    Not in distress  Lungs: clear to auscultation, no rales, wheezes or rhonchi   Heart: s1, s2, no murmurs, rubs or clicks   Abdomen: soft, nontender, no guarding or rebound   Extremities: right AKA      Labs:   Lab Results   Component Value Date/Time    WBC 7.1 2019 06:20 AM    HGB 11.4 (L) 2019 06:20 AM    HCT 35.5 (L) 2019 06:20 AM    PLATELET 759  06:20 AM    MCV 93.7 2019 06:20 AM     Lab Results   Component Value Date/Time    Sodium 145 2019 06:20 AM    Potassium 2.9 (L) 2019 06:20 AM    Chloride 113 (H) 2019 06:20 AM    CO2 26 2019 06:20 AM    Anion gap 6 2019 06:20 AM    Glucose 91 2019 06:20 AM    BUN 8 2019 06:20 AM    Creatinine 0.54 (L) 2019 06:20 AM    BUN/Creatinine ratio 15 2019 06:20 AM    GFR est AA >60 2019 06:20 AM    GFR est non-AA >60 2019 06:20 AM    Calcium 8.5 2019 06:20 AM    Bilirubin, total 1.1 (H) 2019 01:27 AM    AST (SGOT) 35 2019 01:27 AM    Alk. phosphatase 75 2019 01:27 AM    Protein, total 6.8 2019 01:27 AM    Albumin 2.7 (L) 2019 01:27 AM    Globulin 4.1 (H) 2019 01:27 AM    A-G Ratio 0.7 (L) 2019 01:27 AM    ALT (SGPT) 32 2019 01:27 AM         Assessment:       1. Coag neg staph in two out of four bottles from the same site. 2.  Pyuria. 3.  Dementia. 4.  Atrial fibrillation. 5.  Hypothyroidism. Recommendations:     Two bottles from the same site are growing coag neg staph. This likely represents contamination.      Should treat the proteus that grew out from the urine culture on 5/22.      Bonnie Araujo MD

## 2019-05-30 NOTE — ACP (ADVANCE CARE PLANNING)
ACP note:    AMD is not on file here, but per patient's children a copy is at home.  '  Verbally [facility]  [phone]     He is a DNR, all children in agreement. Goal is to minimize care transitions / visits to emergency rooms. I reviewed Hospice type care, focusing on comfort and symptom management. Children will discuss amongst themselves and notify me if ready to place referral prior to discharge from [facility]. Otherwise they know to contact staff at the [facility] to meet with Hospice if desired.

## 2019-05-30 NOTE — PROGRESS NOTES
Bedside and Verbal shift change report given to TRICIA Delarosa (oncoming nurse) by Jojo Shepherd RN (offgoing nurse).  Report included the following information SBAR, Kardex, ED Summary, Intake/Output, MAR, Recent Results and Cardiac Rhythm SR.

## 2019-05-30 NOTE — PROGRESS NOTES
10:50 AM  Chart reviewed. Patient reviewed in [facility] rounds. Patient to continue IV antibiotics. Palliative following and has a family meeting scheduled today at 1:00 pm to review goals of care. Patient to return to [facility] at discharge. CM will continue to follow and assist with disposition needs as they arise.     ISAEL Jin/CRM  Care Management

## 2019-05-30 NOTE — CONSULTS
Palliative Medicine Consult  [place]: [phone] ([phone])    Patient Name: Larose Curling  YOB: 1923    Date of Initial Consult: 5/29/19  Reason for Consult: Care decisions  Requesting Provider: Dr. Dennise Blas  Primary Care Physician: None     SUMMARY:   Larose Curling is a 80 y. o. with a past history of dementia, HTN, Right AKA, hypothyroidism and urinary retention s/p suprapubic catheter, who was admitted on 5/27/2019 from the ED with concerns of sepsis. He was sent to the ED form his NH initially due to a clogged suprapubic catheter, however documentation confirms it was patent upon presentation. Hospitalization has been complicated by delirium. Current medical issues leading to Palliative Medicine involvement include: advanced care planning for a patient with moderate-severe dementia, recurrent ED presentations for catheter issues and fall (5/4, 5/14, 5/22). Social Hx:  Pt is , they have three sons Stella Ramos and Stephani Miller and a daughter Ramy. Son Stephani Miller is a pathologist in [place] and serves as patient's mPOA according to verbal report. PALLIATIVE DIAGNOSES:   1. Delirium  2. Dementia  3. Debility  4. Suprapubic catheter with pyruria     PLAN:   1. Assessed pt this afternoon, no family at bedside. 2. He is alert, oriented to self and able to answer a few simple questions. Appears paranoid and is having delusions of his wife being in the room. He is having paranoia that she is having an affair (son later confirmed this has been a persistent delusion of his). 3. Pt is stable from a cardiopulmonary standpoint. 4. He has been assessed by SLP and is tolerating previous diet of purees with thin liquids. His intake is good with hand feeding. 5. Reached out to son Dr. Frandy Gandhi who reportedly serves as his mPOA.     6. We will talk tomorrow by speaker phone along with pt's daughter Ramy who is local here to [place] to review care goals and expectations with advancing dementia. 7. I asked Dr. Rj Cr to fax me a copy of pt's AMD.    8. Initial consult note routed to primary continuity provider and/or primary health care team members  9. Communicated plan of care with: Palliative IDT, [facility] Team     GOALS OF CARE / TREATMENT PREFERENCES:     GOALS OF CARE:  Patient/Health Care Proxy Stated Goals: Other (comment)(pending discussion)    TREATMENT PREFERENCES:   Code Status: DNR    Advance Care Planning:  [] [facility] Interdisciplinary Team has updated the ACP Navigator with [place] and Patient Capacity    Awaiting AMD to be sent to provider. Medical Interventions: Other (comment)(pending discussion)     Other Instructions: *        Other:    As far as possible, the palliative care team has discussed with patient / health care proxy about goals of care / treatment preferences for patient.      HISTORY:     History obtained from:   Chart review, son     CHIEF COMPLAINT: pt unable to report    HPI/SUBJECTIVE:    The patient is:   [] Verbal and participatory  [x] Non-participatory due to:  Dementia, delirium      Clinical Pain Assessment (nonverbal scale for severity on nonverbal patients):   Clinical Pain Assessment  Severity: 0     Activity (Movement): Lying quietly, normal position    Duration: for how long has pt been experiencing pain (e.g., 2 days, 1 month, years)  Frequency: how often pain is an issue (e.g., several times per day, once every few days, constant)     FUNCTIONAL ASSESSMENT:     Palliative Performance Scale (PPS):  PPS: 40(30-40)       PSYCHOSOCIAL/SPIRITUAL SCREENING:     Palliative IDT has assessed this patient for cultural preferences / practices and a referral made as appropriate to needs (Cultural Services, Patient Advocacy, Ethics, etc.)    Any spiritual / Hindu concerns:  [] Yes /  [x] No    Caregiver Burnout:  [] Yes /  [x] No /  [x] No Caregiver Present      Anticipatory grief assessment:   [x] Normal / [] Maladaptive       ESAS Anxiety: Anxiety: 5    ESAS Depression:          REVIEW OF SYSTEMS:     Positive and pertinent negative findings in ROS are noted above in HPI. The following systems were [x] reviewed / [] unable to be reviewed as noted in HPI  Other findings are noted below. Systems: constitutional, ears/nose/mouth/throat, respiratory, gastrointestinal, genitourinary, musculoskeletal, integumentary, neurologic, psychiatric, endocrine. Positive findings noted below. Modified ESAS Completed by: provider           Pain: 0   Anxiety: 5                            PHYSICAL EXAM:     From RN flowsheet:  Wt Readings from Last 3 Encounters:   05/29/19 81.8 kg (180 lb 4.8 oz)   05/22/19 82.6 kg (182 lb)   05/04/19 82.6 kg (182 lb 1.6 oz)     Blood pressure 121/77, pulse 78, temperature 98.6 °F (37 °C), resp. rate 19, height 6' (1.829 m), weight 81.8 kg (180 lb 4.8 oz), SpO2 97 %. Pain Scale 1: Numeric (0 - 10)  Pain Intensity 1: 0                 Last bowel movement, if known:     Elderly  male supine in bed, NAD  Resp unlabored  Abd soft, no facial grimacing or vocalization with palpation  Right AKA  Alert, makes eye contact, knows self and names of children. Unable to answer other orientation questions. Unable to answer questions about his health. Mumbles concerns about his wife, hallucinates her presence in the room. Appears distressed.          HISTORY:     Principal Problem:    Dehydration (5/27/2019)    Active Problems:    Sepsis ([address] (5/27/2019)      Urinary retention (5/27/2019)      Hypokalemia (5/27/2019)      Acute cystitis without hematuria (5/27/2019)      Alzheimer's dementia (5/27/2019)      Past Medical History:   Diagnosis Date    A-fib ([address]     Hypertension     Spondylosis     Thyroid disease     TIA (transient ischemic attack)     Urinary retention       Past Surgical History:   Procedure Laterality Date    HX PROSTATECTOMY  1994    HX SPLENECTOMY      IR ASP BLADDER SUPRA CATH  4/10/2019      History reviewed. No pertinent family history. History reviewed, no pertinent family history.   Social History     Tobacco Use    Smoking status: Never Smoker    Smokeless tobacco: Never Used   Substance Use Topics    Alcohol use: No     Frequency: Never     Allergies   Allergen Reactions    Codeine Other (comments)     vomiting       Current Facility-Administered Medications   Medication Dose Route Frequency    phosphorus (K PHOS NEUTRAL) 250 mg tablet 1 Tab  1 Tab Oral BID    0.45% sodium chloride infusion  75 mL/hr IntraVENous CONTINUOUS    balsam peru-castor oil (VENELEX) ointment   Topical Q8H    donepezil (ARICEPT) tablet 5 mg  5 mg Oral DAILY    sodium chloride (NS) flush 5-40 mL  5-40 mL IntraVENous Q8H    sodium chloride (NS) flush 5-40 mL  5-40 mL IntraVENous PRN    acetaminophen (TYLENOL) tablet 650 mg  650 mg Oral Q4H PRN    cefTRIAXone (ROCEPHIN) 1 g in 0.9% sodium chloride (MBP/ADV) 50 mL  1 g IntraVENous Q24H    amLODIPine (NORVASC) tablet 10 mg  10 mg Oral DAILY    aspirin delayed-release tablet 81 mg  81 mg Oral DAILY    clonazePAM (KlonoPIN) tablet 0.5 mg  0.5 mg Oral QHS    lactulose (CHRONULAC) 10 gram/15 mL solution 15 mL  10 g Oral TID PRN    levothyroxine (SYNTHROID) tablet 50 mcg  50 mcg Oral ACB    ondansetron (ZOFRAN) injection 4 mg  4 mg IntraVENous Q6H PRN    heparin (porcine) injection 5,000 Units  5,000 Units SubCUTAneous BID          LAB AND IMAGING FINDINGS:     Lab Results   Component Value Date/Time    WBC 8.5 05/29/2019 01:21 AM    HGB 11.7 (L) 05/29/2019 01:21 AM    PLATELET 810 01/44/7423 01:21 AM     Lab Results   Component Value Date/Time    Sodium 147 (H) 05/29/2019 01:21 AM    Potassium 3.0 (L) 05/29/2019 01:21 AM    Chloride 115 (H) 05/29/2019 01:21 AM    CO2 24 05/29/2019 01:21 AM    BUN 15 05/29/2019 01:21 AM    Creatinine 0.44 (L) 05/29/2019 01:21 AM    Calcium 8.3 (L) 05/29/2019 01:21 AM    Magnesium 1.8 05/29/2019 01:21 AM    Phosphorus 1.6 (L) 05/29/2019 01:21 AM      Lab Results   Component Value Date/Time    AST (SGOT) 35 05/27/2019 01:27 AM    Alk. phosphatase 75 05/27/2019 01:27 AM    Protein, total 6.8 05/27/2019 01:27 AM    Albumin 2.7 (L) 05/27/2019 01:27 AM    Globulin 4.1 (H) 05/27/2019 01:27 AM     No results found for: INR, PTMR, PTP, PT1, PT2, APTT   No results found for: IRON, FE, TIBC, IBCT, PSAT, FERR   No results found for: PH, PCO2, PO2  No components found for: GLPOC   No results found for: CPK, CKMB             Total time:   Counseling / coordination time, spent as noted above:   > 50% counseling / coordination?:     Prolonged service was provided for  []30 min   []75 min in face to face time in the presence of the patient, spent as noted above. Time Start:   Time End:   Note: this can only be billed with 29470 (initial) or 46379 (follow up). If multiple start / stop times, list each separately.

## 2019-05-30 NOTE — PROGRESS NOTES
Hospitalist Progress Note                               Monserrat Pickens MD                                     Answering service: [phone]                               [phone] from in house phone                                         Date of Service:  2019  NAME:  Fly Winters  :  1923  MRN:  [id number]      Admission Summary:     49-year-old gentleman with a PMH significant for dementia, hypertension, Afib and off chronic anticoagulation, hypothyroidism, TIA, urinary retention, right AKA, and debility was [facility] from [facility] for blocked suprapubic Lance catheter. Reason for follow up:       CC: Blocked Suprapubic Lance catheter. First encounter  Patient seen and examined by the bedside  Labs, images and notes reviewed  Patient is sleepy but comfortable, wakes up to verbal stimuli. Afebrile  Vitals stable  DW son over the phone. Discussed with nursing staff, no acute issues overnight, orders reviewed. Assessment & Plan:     - Chronic Suprapubic Lance catheter for urinary retention with resolving blockage. Urology consult noted    - Severe sepsis due to UTI associated with chronic suprapubic catheter presence (recurrent UTI ) and with End-organ dysfunction including Hypotension and Lactic acidemia all present on admission. Resolving now  BC x 2 show CoNS, Zaynab Nikhil stopped by ID  Urine cx  shows proteus, cont ceftriaxone for that  Repeat BC x 2: NGTD  ID consult appreciated    - Hypernatremia  Poor due to poor oral intake  Continue IVFs. -NSVT  Resolved, likely due to electrolyte abnormalities  Received potassium and phos supplementation  Cont monitored bed    -Acute multifactorial metabolic Encephalopathy. POA  On top of Probable Advanced Alzheimer's Dementia. H/O TIA. Acute encephalopathy is improving  Palliative care consult noted, family meeting today    - Primary Hypertension. Chronic Afib.  (Off chronic anticoagulation). Controlled HR    - Hypokalemia and hypophosphatemia. replete, repeat BMP in am.    - Hypothyroidism. Cont home meds    - Dysphagia: Pureed diet    - H/O right Above knee amputation.    - Rehab: Overall debility. Needs PT/OT    -  Prophylaxis: DVT    - Directives: DNR/DNI with an extremely guarded prognosis. D/W patient's daughter Kandis Earl    - Plan: Anticipate D/C back to [facility] [address] [place] [address] in 3-5 days. Dr Guanakito Carvajal who can be reached at [phone]. D/W Nursing. Hospital Problems  Date Reviewed: 5/27/2019          Codes Class Noted POA    * (Principal) Dehydration ICD-10-CM: E86.0  ICD-9-CM: 276.51  5/27/2019 Yes        Sepsis ([address] ICD-10-CM: A41.9  ICD-9-CM: 038.9, 995.91  5/27/2019 Yes        Urinary retention ICD-10-CM: R33.9  ICD-9-CM: 788.20  5/27/2019 Yes        Hypokalemia ICD-10-CM: E87.6  ICD-9-CM: 276.8  5/27/2019 Yes        Acute cystitis without hematuria ICD-10-CM: N30.00  ICD-9-CM: 595.0  5/27/2019 Yes        Alzheimer's dementia ICD-10-CM: G30.9, F02.80  ICD-9-CM: 331.0  5/27/2019 Yes              Physical Examination:      Last 24hrs VS reviewed since prior progress note. Most recent are:  Visit Vitals  /82   Pulse 76   Temp 98 °F (36.7 °C)   Resp 14   Ht 6' (1.829 m)   Wt 83.1 kg (183 lb 3.2 oz)   SpO2 96%   BMI 24.85 kg/m²           Constitutional: Comfortable, sleepy but arousable   HEENT: Head is a traumatic,    Resp:  CTA BL, no rales   CV:  irregularly irregular    GI:  Soft, non distended, non tender. : Suprapubic Lance catheter with clear urine in bag        Musculoskeletal:  R. AKA stump. 1+ edema LLE. Neurologic: Sleepy, wakes up to verbal stimuli.               Intake/Output Summary (Last 24 hours) at 5/30/2019 1359  Last data filed at 5/30/2019 0000  Gross per 24 hour   Intake    Output 750 ml   Net -750 ml          Labs:     Recent Labs     05/30/19  0620 05/29/19  0121   WBC 7.1 8.5   HGB 11.4* 11.7*   HCT 35.5* 36.7    278 Recent Labs     05/30/19  0620 05/29/19  0121 05/28/19  0033    147* 149*   K 2.9* 3.0* 3.4*   * 115* 116*   CO2 26 24 25   BUN 8 15 26*   CREA 0.54* 0.44* 0.51*   GLU 91 89 96   CA 8.5 8.3* 8.2*   MG  --  1.8  --    PHOS 2.8 1.6*  --      No results for input(s): SGOT, GPT, ALT, AP, TBIL, TBILI, TP, ALB, GLOB, GGT, AML, LPSE in the last 72 hours. No lab exists for component: AMYP, HLPSE  No results for input(s): INR, PTP, APTT in the last 72 hours. No lab exists for component: INREXT, INREXT   No results for input(s): FE, TIBC, PSAT, FERR in the last 72 hours. No results found for: FOL, RBCF   No results for input(s): PH, PCO2, PO2 in the last 72 hours. No results for input(s): CPK, CKNDX, TROIQ in the last 72 hours.     No lab exists for component: CPKMB  No results found for: CHOL, CHOLX, CHLST, CHOLV, HDL, LDL, LDLC, DLDLP, TGLX, TRIGL, TRIGP, CHHD, CHHDX  Lab Results   Component Value Date/Time    Glucose (POC) 102 (H) 05/27/2019 04:13 PM     Lab Results   Component Value Date/Time    Color YELLOW 05/27/2019 06:32 AM    Appearance TURBID (A) 05/27/2019 06:32 AM    Specific gravity 1.028 05/27/2019 06:32 AM    pH (UA) 6.0 05/27/2019 06:32 AM    Protein 100 (A) 05/27/2019 06:32 AM    Glucose NEGATIVE  05/27/2019 06:32 AM    Ketone NEGATIVE  05/27/2019 06:32 AM    Bilirubin NEGATIVE  05/04/2019 08:35 AM    Urobilinogen 1.0 05/27/2019 06:32 AM    Nitrites NEGATIVE  05/27/2019 06:32 AM    Leukocyte Esterase LARGE (A) 05/27/2019 06:32 AM    Epithelial cells FEW 05/27/2019 06:32 AM    Bacteria 1+ (A) 05/27/2019 06:32 AM    WBC >100 (H) 05/27/2019 06:32 AM    RBC >100 (H) 05/27/2019 06:32 AM         Medications Reviewed:     Current Facility-Administered Medications   Medication Dose Route Frequency    potassium chloride 10 mEq in 50 ml IVPB  10 mEq IntraVENous Q1H    phosphorus (K PHOS NEUTRAL) 250 mg tablet 1 Tab  1 Tab Oral BID    balsam peru-castor oil (VENELEX) ointment   Topical Q8H    donepezil (ARICEPT) tablet 5 mg  5 mg Oral DAILY    sodium chloride (NS) flush 5-40 mL  5-40 mL IntraVENous Q8H    sodium chloride (NS) flush 5-40 mL  5-40 mL IntraVENous PRN    acetaminophen (TYLENOL) tablet 650 mg  650 mg Oral Q4H PRN    cefTRIAXone (ROCEPHIN) 1 g in 0.9% sodium chloride (MBP/ADV) 50 mL  1 g IntraVENous Q24H    amLODIPine (NORVASC) tablet 10 mg  10 mg Oral DAILY    aspirin delayed-release tablet 81 mg  81 mg Oral DAILY    clonazePAM (KlonoPIN) tablet 0.5 mg  0.5 mg Oral QHS    lactulose (CHRONULAC) 10 gram/15 mL solution 15 mL  10 g Oral TID PRN    levothyroxine (SYNTHROID) tablet 50 mcg  50 mcg Oral ACB    ondansetron (ZOFRAN) injection 4 mg  4 mg IntraVENous Q6H PRN    heparin (porcine) injection 5,000 Units  5,000 Units SubCUTAneous BID     ______________________________________________________________________  EXPECTED LENGTH OF STAY: 4d 21h  ACTUAL LENGTH OF STAY:          3                 Niyah Boyce MD

## 2019-05-30 NOTE — PROGRESS NOTES
Problem: Falls - Risk of  Goal: *Absence of Falls  Description  Document Ingris Bullard Fall Risk and appropriate interventions in the flowsheet. Outcome: Progressing Towards Goal     Problem: Patient Education: Go to Patient Education Activity  Goal: Patient/Family Education  Outcome: Progressing Towards Goal     Problem: Pressure Injury - Risk of  Goal: *Prevention of pressure injury  Description  Document Arron Scale and appropriate interventions in the flowsheet.   Outcome: Progressing Towards Goal     Problem: Patient Education: Go to Patient Education Activity  Goal: Patient/Family Education  Outcome: Progressing Towards Goal     Problem: Patient Education: Go to Patient Education Activity  Goal: Patient/Family Education  Outcome: Progressing Towards Goal     Problem: Sepsis: Day 3  Goal: *Oxygen saturation within defined limits  Outcome: Progressing Towards Goal  Goal: *Vital sign stability  Outcome: Progressing Towards Goal  Goal: *Tolerating diet  Outcome: Progressing Towards Goal  Goal: *Demonstrates progressive activity  Outcome: Progressing Towards Goal  Goal: Activity/Safety  Outcome: Progressing Towards Goal  Goal: Consults, if ordered  Outcome: Progressing Towards Goal  Goal: Diagnostic Test/Procedures  Outcome: Progressing Towards Goal  Goal: Nutrition/Diet  Outcome: Progressing Towards Goal  Goal: Discharge Planning  Outcome: Progressing Towards Goal  Goal: Medications  Outcome: Progressing Towards Goal  Goal: Respiratory  Outcome: Progressing Towards Goal  Goal: Treatments/Interventions/Procedures  Outcome: Progressing Towards Goal  Goal: Psychosocial  Outcome: Progressing Towards Goal     Problem: Infection - Risk of, Urinary Catheter-Associated Urinary Tract Infection  Goal: *Absence of infection signs and symptoms  Outcome: Progressing Towards Goal     Problem: Patient Education: Go to Patient Education Activity  Goal: Patient/Family Education  Outcome: Progressing Towards Goal     Problem: Risk for Spread of Infection  Goal: Prevent transmission of infectious organism to others  Description  Prevent the transmission of infectious organisms to other patients, staff members, and visitors. Outcome: Progressing Towards Goal     Problem: Patient Education:  Go to Education Activity  Goal: Patient/Family Education  Outcome: Progressing Towards Goal     Problem: Impaired Skin Integrity/Pressure Injury Treatment  Goal: *Improvement of Existing Pressure Injury  Outcome: Progressing Towards Goal  Goal: *Prevention of pressure injury  Description  Document Arron Scale and appropriate interventions in the flowsheet.   Outcome: Progressing Towards Goal     Problem: Patient Education: Go to Patient Education Activity  Goal: Patient/Family Education  Outcome: Progressing Towards Goal     Problem: Patient Education: Go to Patient Education Activity  Goal: Patient/Family Education  Outcome: Progressing Towards Goal     Problem: Nutrition Deficit  Goal: *Optimize nutritional status  Outcome: Progressing Towards Goal

## 2019-05-31 LAB
ANION GAP SERPL CALC-SCNC: 5 MMOL/L (ref 5–15)
BUN SERPL-MCNC: 11 MG/DL (ref 6–20)
BUN/CREAT SERPL: 22 (ref 12–20)
CALCIUM SERPL-MCNC: 8.2 MG/DL (ref 8.5–10.1)
CHLORIDE SERPL-SCNC: 113 MMOL/L (ref 97–108)
CO2 SERPL-SCNC: 26 MMOL/L (ref 21–32)
CREAT SERPL-MCNC: 0.5 MG/DL (ref 0.7–1.3)
GLUCOSE SERPL-MCNC: 85 MG/DL (ref 65–100)
POTASSIUM SERPL-SCNC: 3.5 MMOL/L (ref 3.5–5.1)
SODIUM SERPL-SCNC: 144 MMOL/L (ref 136–145)

## 2019-05-31 PROCEDURE — 74011250636 HC RX REV CODE- 250/636: Performed by: INTERNAL MEDICINE

## 2019-05-31 PROCEDURE — 80048 BASIC METABOLIC PNL TOTAL CA: CPT

## 2019-05-31 PROCEDURE — 74011250637 HC RX REV CODE- 250/637: Performed by: INTERNAL MEDICINE

## 2019-05-31 PROCEDURE — 77030020186 HC BOOT HL PROTCT SAGE -B

## 2019-05-31 PROCEDURE — 36415 COLL VENOUS BLD VENIPUNCTURE: CPT

## 2019-05-31 PROCEDURE — 65270000029 HC RM PRIVATE

## 2019-05-31 RX ORDER — CEFUROXIME AXETIL 250 MG/1
500 TABLET ORAL EVERY 12 HOURS
Status: DISCONTINUED | OUTPATIENT
Start: 2019-05-31 | End: 2019-06-01 | Stop reason: HOSPADM

## 2019-05-31 RX ORDER — POTASSIUM CHLORIDE 750 MG/1
40 TABLET, FILM COATED, EXTENDED RELEASE ORAL
Status: DISCONTINUED | OUTPATIENT
Start: 2019-05-31 | End: 2019-05-31

## 2019-05-31 RX ORDER — POTASSIUM CHLORIDE 1.5 G/1.77G
40 POWDER, FOR SOLUTION ORAL
Status: COMPLETED | OUTPATIENT
Start: 2019-05-31 | End: 2019-05-31

## 2019-05-31 RX ADMIN — Medication 10 ML: at 21:51

## 2019-05-31 RX ADMIN — POTASSIUM CHLORIDE 40 MEQ: 1.5 POWDER, FOR SOLUTION ORAL at 17:51

## 2019-05-31 RX ADMIN — CLONAZEPAM 0.5 MG: 0.5 TABLET ORAL at 21:50

## 2019-05-31 RX ADMIN — CASTOR OIL AND BALSAM, PERU: 788; 87 OINTMENT TOPICAL at 06:26

## 2019-05-31 RX ADMIN — AMLODIPINE BESYLATE 10 MG: 5 TABLET ORAL at 09:23

## 2019-05-31 RX ADMIN — CASTOR OIL AND BALSAM, PERU: 788; 87 OINTMENT TOPICAL at 21:52

## 2019-05-31 RX ADMIN — HEPARIN SODIUM 5000 UNITS: 5000 INJECTION INTRAVENOUS; SUBCUTANEOUS at 09:23

## 2019-05-31 RX ADMIN — DONEPEZIL HYDROCHLORIDE 5 MG: 5 TABLET, FILM COATED ORAL at 09:23

## 2019-05-31 RX ADMIN — Medication 10 ML: at 06:28

## 2019-05-31 RX ADMIN — CASTOR OIL AND BALSAM, PERU: 788; 87 OINTMENT TOPICAL at 14:48

## 2019-05-31 RX ADMIN — CEFUROXIME AXETIL 500 MG: 250 TABLET ORAL at 17:51

## 2019-05-31 RX ADMIN — DIBASIC SODIUM PHOSPHATE, MONOBASIC POTASSIUM PHOSPHATE AND MONOBASIC SODIUM PHOSPHATE 1 TABLET: 852; 155; 130 TABLET ORAL at 09:28

## 2019-05-31 RX ADMIN — ASPIRIN 81 MG: 81 TABLET ORAL at 09:23

## 2019-05-31 RX ADMIN — LEVOTHYROXINE SODIUM 50 MCG: 25 TABLET ORAL at 06:26

## 2019-05-31 RX ADMIN — Medication 10 ML: at 14:46

## 2019-05-31 RX ADMIN — HEPARIN SODIUM 5000 UNITS: 5000 INJECTION INTRAVENOUS; SUBCUTANEOUS at 17:51

## 2019-05-31 NOTE — PROGRESS NOTES
TRANSFER - OUT REPORT:    Verbal report given to TRICIA Dominguez from [employer], RN (name) on Taz Tapia  being transferred to (unit) for routine progression of care       Report consisted of patients Situation, Background, Assessment and   Recommendations(SBAR). Information from the following report(s) SBAR, Kardex, ED Summary, MAR, Recent Results and Cardiac Rhythm A fib was reviewed with the receiving nurse. Lines:   Peripheral IV 05/27/19 Anterior; Left Hand (Active)   Site Assessment Clean, dry, & intact 5/30/2019  4:00 PM   Phlebitis Assessment 0 5/30/2019  4:00 PM   Infiltration Assessment 0 5/30/2019  4:00 PM   Dressing Status Clean, dry, & intact 5/30/2019  4:00 PM   Dressing Type Transparent;Tape 5/30/2019  4:00 PM   Hub Color/Line Status Blue;Capped 5/30/2019  4:00 PM   Action Taken Open ports on tubing capped 5/30/2019  4:00 PM   Alcohol Cap Used Yes 5/30/2019  4:00 PM       Peripheral IV 05/29/19 Left Arm (Active)   Site Assessment Clean, dry, & intact 5/30/2019  4:00 PM   Phlebitis Assessment 0 5/30/2019  4:00 PM   Infiltration Assessment 0 5/30/2019  4:00 PM   Dressing Status Clean, dry, & intact 5/30/2019  4:00 PM   Dressing Type Transparent;Tape 5/30/2019  4:00 PM   Hub Color/Line Status Pink; Infusing 5/30/2019  4:00 PM   Action Taken Open ports on tubing capped 5/30/2019  4:00 PM   Alcohol Cap Used Yes 5/30/2019  4:00 PM        Opportunity for questions and clarification was provided.       Patient transported with:   Monitor  O2 @ 2 liters  Registered Nurse  [facility]

## 2019-05-31 NOTE — PROGRESS NOTES
Physical Therapy Screening:  Services are not indicated at this time. An InBasket screening referral was triggered for physical therapy based on results obtained during the nursing admission assessment. The patients chart was reviewed and the patient is not appropriate for a skilled therapy evaluation at this time. Please consult physical therapy if any therapy needs arise. Thank you.     Chaim Nagel, PT

## 2019-05-31 NOTE — PROGRESS NOTES
Hospitalist Progress Note                               John Seay MD                                     Answering service: [phone]                               OR 36 from in house phone                                         Date of Service:  2019  NAME:  Celeste Mcconnell  :  1923  MRN:  [id number]      Admission Summary:     14-year-old gentleman with a PMH significant for dementia, hypertension, Afib and off chronic anticoagulation, hypothyroidism, TIA, urinary retention, right AKA, and debility [facility] blocked suprapubic Lance catheter. Reason for follow up:       CC: Blocked Suprapubic Lance catheter. Patient seen and examined by the bedside  Labs, images and notes reviewed  Patient is comfortable today, more awake and lucid. Reports having very vivid dreams. Patient is conversant today. DW daughter by the bedside       Assessment & Plan:     - Chronic Suprapubic Lance catheter for urinary retention with resolving blockage. Urology consult noted    - Severe sepsis due to UTI associated with chronic suprapubic catheter presence (recurrent UTI ) and with End-organ dysfunction including Hypotension and Lactic acidemia all present on admission. Resolving now  BC x 2 show CoNS, Vicki Iwona stopped by ID  Urine cx  shows proteus, ID consult noted, changed to Ceftin  Repeat BC x 2: NGTD    - Hypernatremia  Poor due to poor oral intake  improving    -NSVT  Resolved, likely due to electrolyte abnormalities  Received potassium and phos supplementation  Cont monitored bed    -Acute multifactorial metabolic Encephalopathy. POA  On top of Probable Advanced Alzheimer's Dementia. H/O TIA. Acute encephalopathy is improving  Palliative care notes reviewed, Hospice consult placed on the request of the family. - Primary Hypertension. Chronic Afib. (Off chronic anticoagulation).   Controlled HR    - Hypokalemia and hypophosphatemia. repleted, repeat BMP in am.    - Hypothyroidism. Cont home meds    - Dysphagia: Pureed diet    - H/O right Above knee amputation.    - Rehab: Overall debility. Needs PT/OT    -  Prophylaxis: DVT    - Directives: DNR/DNI with an extremely guarded prognosis. D/W patient's daughter Ramy    - Plan: Anticipate D/C back to [facility] in am.      Hospital Problems  Date Reviewed: 5/27/2019          Codes Class Noted POA    * (Principal) Dehydration ICD-10-CM: E86.0  ICD-9-CM: 276.51  5/27/2019 Yes        Sepsis ([address] ICD-10-CM: A41.9  ICD-9-CM: 038.9, 995.91  5/27/2019 Yes        Urinary retention ICD-10-CM: R33.9  ICD-9-CM: 788.20  5/27/2019 Yes        Hypokalemia ICD-10-CM: E87.6  ICD-9-CM: 276.8  5/27/2019 Yes        Acute cystitis without hematuria ICD-10-CM: N30.00  ICD-9-CM: 595.0  5/27/2019 Yes        Alzheimer's dementia ICD-10-CM: G30.9, F02.80  ICD-9-CM: 331.0  5/27/2019 Yes              Physical Examination:      Last 24hrs VS reviewed since prior progress note. Most recent are:  Visit Vitals  /74 (BP 1 Location: Right arm, BP Patient Position: At rest)   Pulse 76   Temp 98.5 °F (36.9 °C)   Resp 18   Ht 6' (1.829 m)   Wt 83.1 kg (183 lb 3.2 oz)   SpO2 92%   BMI 24.85 kg/m²           Constitutional: Comfortable, awake   HEENT: Head is a traumatic,    Resp:  CTA BL, no rales   CV:  irregularly irregular    GI:  Soft, non distended, non tender. : Suprapubic Lance catheter with clear urine in bag        Musculoskeletal:  R. AKA stump. 1+ edema LLE.                   Intake/Output Summary (Last 24 hours) at 5/31/2019 1456  Last data filed at 5/31/2019 1129  Gross per 24 hour   Intake    Output 1600 ml   Net -1600 ml          Labs:     Recent Labs     05/30/19  0620 05/29/19  0121   WBC 7.1 8.5   HGB 11.4* 11.7*   HCT 35.5* 36.7    278     Recent Labs     05/31/19  0409 05/30/19  0620 05/29/19 0121    145 147*   K 3.5 2.9* 3.0*   * 113* 115*   CO2 26 26 24   BUN 11 8 15   CREA 0.50* 0.54* 0.44*   GLU 85 91 89   CA 8.2* 8.5 8.3*   MG  --   --  1.8   PHOS  --  2.8 1.6*     No results for input(s): SGOT, GPT, ALT, AP, TBIL, TBILI, TP, ALB, GLOB, GGT, AML, LPSE in the last 72 hours. No lab exists for component: AMYP, HLPSE  No results for input(s): INR, PTP, APTT in the last 72 hours. No lab exists for component: INREXT, INREXT   No results for input(s): FE, TIBC, PSAT, FERR in the last 72 hours. No results found for: FOL, RBCF   No results for input(s): PH, PCO2, PO2 in the last 72 hours. No results for input(s): CPK, CKNDX, TROIQ in the last 72 hours.     No lab exists for component: CPKMB  No results found for: CHOL, CHOLX, CHLST, CHOLV, HDL, LDL, LDLC, DLDLP, TGLX, TRIGL, TRIGP, CHHD, CHHDX  Lab Results   Component Value Date/Time    Glucose (POC) 102 (H) 05/27/2019 04:13 PM     Lab Results   Component Value Date/Time    Color YELLOW 05/27/2019 06:32 AM    Appearance TURBID (A) 05/27/2019 06:32 AM    Specific gravity 1.028 05/27/2019 06:32 AM    pH (UA) 6.0 05/27/2019 06:32 AM    Protein 100 (A) 05/27/2019 06:32 AM    Glucose NEGATIVE  05/27/2019 06:32 AM    Ketone NEGATIVE  05/27/2019 06:32 AM    Bilirubin NEGATIVE  05/04/2019 08:35 AM    Urobilinogen 1.0 05/27/2019 06:32 AM    Nitrites NEGATIVE  05/27/2019 06:32 AM    Leukocyte Esterase LARGE (A) 05/27/2019 06:32 AM    Epithelial cells FEW 05/27/2019 06:32 AM    Bacteria 1+ (A) 05/27/2019 06:32 AM    WBC >100 (H) 05/27/2019 06:32 AM    RBC >100 (H) 05/27/2019 06:32 AM         Medications Reviewed:     Current Facility-Administered Medications   Medication Dose Route Frequency    cefUROXime (CEFTIN) tablet 500 mg  500 mg Oral Q12H    clonazePAM (KlonoPIN) tablet 0.5 mg  0.5 mg Oral QHS PRN    phosphorus (K PHOS NEUTRAL) 250 mg tablet 1 Tab  1 Tab Oral BID    balsam peru-castor oil (VENELEX) ointment   Topical Q8H    donepezil (ARICEPT) tablet 5 mg  5 mg Oral DAILY    sodium chloride (NS) flush 5-40 mL  5-40 mL IntraVENous Q8H    sodium chloride (NS) flush 5-40 mL  5-40 mL IntraVENous PRN    acetaminophen (TYLENOL) tablet 650 mg  650 mg Oral Q4H PRN    amLODIPine (NORVASC) tablet 10 mg  10 mg Oral DAILY    aspirin delayed-release tablet 81 mg  81 mg Oral DAILY    lactulose (CHRONULAC) 10 gram/15 mL solution 15 mL  10 g Oral TID PRN    levothyroxine (SYNTHROID) tablet 50 mcg  50 mcg Oral ACB    ondansetron (ZOFRAN) injection 4 mg  4 mg IntraVENous Q6H PRN    heparin (porcine) injection 5,000 Units  5,000 Units SubCUTAneous BID     ______________________________________________________________________  EXPECTED LENGTH OF STAY: 4d 21h  ACTUAL LENGTH OF STAY:          4                 John Seay MD

## 2019-05-31 NOTE — PROGRESS NOTES
ID Progress Note  2019     Ceftriaxone   - present     Subjective:     Afebrile. More awake today. No conmplaints. Objective:     Vitals:   Visit Vitals  /80 (BP 1 Location: Right arm, BP Patient Position: At rest)   Pulse 80   Temp 98.1 °F (36.7 °C)   Resp 19   Ht 6' (1.829 m)   Wt 83.1 kg (183 lb 3.2 oz)   SpO2 96%   BMI 24.85 kg/m²        Tmax:  Temp (24hrs), Av °F (36.7 °C), Min:97.9 °F (36.6 °C), Max:98.2 °F (36.8 °C)      Exam:    Not in distress  Lungs: clear to auscultation, no rales, wheezes or rhonchi   Heart: s1, s2, no murmurs, rubs or clicks   Abdomen: soft, nontender, no guarding or rebound   Extremities: right AKA      Labs:   Lab Results   Component Value Date/Time    WBC 7.1 2019 06:20 AM    HGB 11.4 (L) 2019 06:20 AM    HCT 35.5 (L) 2019 06:20 AM    PLATELET 927  06:20 AM    MCV 93.7 2019 06:20 AM     Lab Results   Component Value Date/Time    Sodium 144 2019 04:09 AM    Potassium 3.5 2019 04:09 AM    Chloride 113 (H) 2019 04:09 AM    CO2 26 2019 04:09 AM    Anion gap 5 2019 04:09 AM    Glucose 85 2019 04:09 AM    BUN 11 2019 04:09 AM    Creatinine 0.50 (L) 2019 04:09 AM    BUN/Creatinine ratio 22 (H) 2019 04:09 AM    GFR est AA >60 2019 04:09 AM    GFR est non-AA >60 2019 04:09 AM    Calcium 8.2 (L) 2019 04:09 AM    Bilirubin, total 1.1 (H) 2019 01:27 AM    AST (SGOT) 35 2019 01:27 AM    Alk. phosphatase 75 2019 01:27 AM    Protein, total 6.8 2019 01:27 AM    Albumin 2.7 (L) 2019 01:27 AM    Globulin 4.1 (H) 2019 01:27 AM    A-G Ratio 0.7 (L) 2019 01:27 AM    ALT (SGPT) 32 2019 01:27 AM         Assessment:       1. Coag neg staph in two out of four bottles from the same site. 2.  UTI  3. Dementia. 4.  Atrial fibrillation. 5.  Hypothyroidism.         Recommendations:     Two bottles from the same site are growing coag neg staph. This likely represents contamination. Should treat the proteus that grew out from the urine culture on 5/22. Can get cefuroxime 500 mg bid until 6/5.      Lisbet Chaves MD

## 2019-05-31 NOTE — WOUND CARE
Wound Care Note:     Follow-up visit for sacrum    Chart shows:  Admitted for dehydration  Past Medical History:   Diagnosis Date    A-fib ([facility] [address]     Hypertension     Spondylosis     Thyroid disease     TIA (transient ischemic attack)     Urinary retention      WBC = 7.1 on 5/30/19  Admitted from the [facility]    Assessment:   Patient is alert and talking, incontinent with moderate assistance needed in repositioning. Bed: Envision on a [employer] bed frame  Patient has a suprapubic catheter in place. Diet: Dysphagia pureed with nutritional supplements    Left heel and right buttocks skin intact and without erythema. Right AKA. 1. POA sacrum has a partial thickness wound approximately 1.5 cm x 0.5 cm x 0.1 cm on the sacrum, left sacrum still has linear line with some blanching but not in all areas, wound bed is pink, scant serous drainage, selina-wound still has a slight purple hue, some blanching, wound edges are open. Venelex ointment applied. 2.  POA right ischial blanchable erythema has resolved. 3.  Left lateral ankle with small non-blanchable area, approximately 0.5 cm x 0.5 cm x 0 cm, no drainage, selina-wound intact. Venelex ointment applied. Envision air mattress plugged in; did not need to configure mattress. Patient repositioned on right side. Heel offloaded in Prevalon boot. Recommendations:    Continue with current wound care except apply Venelex ointment to left lateral ankle also. Sacrum, left heel and left lateral ankle- Every 8 hours liberally apply Venelex ointment. Keep Prevalon boots on at all times while in bed ONLY. Remove boots when ambulating. Check often to assess that the boots are on properly and heels are floated.     Envision bed ordered for patient through [facility].   Please call [employer] for any issues or when patient discharged at ext. [phone]  Ensure both frame and blower box at foot of bed are plugged in - blower box will have green glowing light when air surface is on (should be on at all times). Use only flat sheet and one incontinence pad. Confirmation #[id number]    Skin Care & Pressure Prevention:  Minimize layers of linen/pads under patient to optimize support surface. Turn/reposition approximately every 2 hours and offload heels.   Manage incontinence / promote continence     Discussed above plan with patient & TRICIA Sanon    Transition of Care: Plan to follow as needed while admitted to hospital.    JOSE Hernandez, RN, [facility], [facility].  office [phone]  pager [phone] or call  to page

## 2019-06-01 VITALS
RESPIRATION RATE: 16 BRPM | BODY MASS INDEX: 24.81 KG/M2 | TEMPERATURE: 97.9 F | OXYGEN SATURATION: 95 % | SYSTOLIC BLOOD PRESSURE: 153 MMHG | WEIGHT: 183.2 LBS | HEART RATE: 88 BPM | HEIGHT: 72 IN | DIASTOLIC BLOOD PRESSURE: 80 MMHG

## 2019-06-01 PROBLEM — E86.0 DEHYDRATION: Status: RESOLVED | Noted: 2019-05-27 | Resolved: 2019-06-01

## 2019-06-01 PROBLEM — R33.9 URINARY RETENTION: Status: RESOLVED | Noted: 2019-05-27 | Resolved: 2019-06-01

## 2019-06-01 PROBLEM — E87.6 HYPOKALEMIA: Status: RESOLVED | Noted: 2019-05-27 | Resolved: 2019-06-01

## 2019-06-01 PROBLEM — A41.9 SEPSIS (HCC): Status: RESOLVED | Noted: 2019-05-27 | Resolved: 2019-06-01

## 2019-06-01 LAB
BACTERIA SPEC CULT: ABNORMAL
SERVICE CMNT-IMP: ABNORMAL

## 2019-06-01 PROCEDURE — 74011250637 HC RX REV CODE- 250/637: Performed by: INTERNAL MEDICINE

## 2019-06-01 PROCEDURE — 74011250636 HC RX REV CODE- 250/636: Performed by: NURSE PRACTITIONER

## 2019-06-01 PROCEDURE — 74011250636 HC RX REV CODE- 250/636: Performed by: INTERNAL MEDICINE

## 2019-06-01 RX ORDER — HALOPERIDOL 5 MG/ML
0.5 INJECTION INTRAMUSCULAR ONCE
Status: COMPLETED | OUTPATIENT
Start: 2019-06-01 | End: 2019-06-01

## 2019-06-01 RX ORDER — CEFUROXIME AXETIL 500 MG/1
500 TABLET ORAL EVERY 12 HOURS
Qty: 8 TAB | Refills: 0 | Status: SHIPPED | OUTPATIENT
Start: 2019-06-01 | End: 2019-06-05

## 2019-06-01 RX ADMIN — CASTOR OIL AND BALSAM, PERU: 788; 87 OINTMENT TOPICAL at 13:16

## 2019-06-01 RX ADMIN — AMLODIPINE BESYLATE 10 MG: 5 TABLET ORAL at 08:40

## 2019-06-01 RX ADMIN — Medication 10 ML: at 13:16

## 2019-06-01 RX ADMIN — LEVOTHYROXINE SODIUM 50 MCG: 25 TABLET ORAL at 06:17

## 2019-06-01 RX ADMIN — DONEPEZIL HYDROCHLORIDE 5 MG: 5 TABLET, FILM COATED ORAL at 08:40

## 2019-06-01 RX ADMIN — CASTOR OIL AND BALSAM, PERU: 788; 87 OINTMENT TOPICAL at 06:17

## 2019-06-01 RX ADMIN — ASPIRIN 81 MG: 81 TABLET ORAL at 08:40

## 2019-06-01 RX ADMIN — HEPARIN SODIUM 5000 UNITS: 5000 INJECTION INTRAVENOUS; SUBCUTANEOUS at 08:39

## 2019-06-01 RX ADMIN — CEFUROXIME AXETIL 500 MG: 250 TABLET ORAL at 08:40

## 2019-06-01 RX ADMIN — Medication 10 ML: at 06:18

## 2019-06-01 RX ADMIN — HALOPERIDOL LACTATE 0.5 MG: 5 INJECTION INTRAMUSCULAR at 03:08

## 2019-06-01 NOTE — DISCHARGE SUMMARY
Inpatient hospitalist discharge summary                Brief Overview    PATIENT ID: Fly Winters    MRN: [id number]     YOB: 1923    Admitting Provider: Mayra Arroyo MD    Discharging Provider: Monserrat Pickens MD   To contact this individual call [phone] and ask the  to page. If unavailable ask to be transferred the Adult Hospitalist Department. 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 Urology consult noted     - Severe sepsis due to UTI associated with chronic suprapubic catheter presence (recurrent UTI ) and with End-organ dysfunction including Hypotension and Lactic acidemia all present on admission. Resolving now  BC x 2 show CoNS, Zaynab Nikhil stopped by ID  Urine cx 5/22 shows proteus, ID consult noted, changed to Ceftin till 6/5  Repeat BC x 2: NGTD     - Hypernatremia  Poor due to poor oral intake  improving     -NSVT  Resolved, likely due to electrolyte abnormalities  Cont monitored bed     -Acute multifactorial metabolic Encephalopathy. POA  On top of Probable Advanced Alzheimer's Dementia. H/O TIA. Acute encephalopathy is improving  Palliative care notes reviewed, Hospice consult to be asked on arrival to NH     - Primary Hypertension. Chronic Afib. (Off chronic anticoagulation). Controlled HR     - Hypokalemia and hypophosphatemia. repleted     - Hypothyroidism. Cont home meds     - Dysphagia: Pureed diet     - H/O right Above knee amputation.     On the date of discharge, diagnostic face to face encounter was performed. Patient was hemodynamically stable, offering no new complaints. Denies any shortness of breath at rest, no fevers or chills, no diarrhea or constipation. Patient is agreeable for discharge. Patient understood and verbalized the understanding of the discharge plan. Patient was advised to seek medical help/ care or return to ED, if symptoms recur, worsen or new symptoms develop. Discharge Disposition:  [facility]    Discharge activity:  Bedrest    Code status at discharge:  DNR     Active issues requiring follow up:  Hospice eval needed    Outpatient follow up: Follow-up Information     Follow up With Specialties Details Why Alison Rendon MD Urology Schedule an appointment as soon as possible for a visit reschedule May 30 appt and schedule next suprapubic tube change [facility]  [phone]      None    None (395) Patient stated that they have no PCP      FU with PCP at the NH   for post-hospitalization follow up, with in 7 days           Test results pending upon discharge:  n/a    Operative procedures performed:      Treatments: IV hydration and antibiotics: vancomycin and ceftriaxone    Consults:  IP CONSULT TO HOSPITALIST  IP CONSULT TO UROLOGY  IP CONSULT TO INFECTIOUS DISEASES  IP CONSULT TO PALLIATIVE CARE - PROVIDER    Procedures: none     Diet:  DIET DYSPHAGIA PUREED (NDD1)   DIET ONE TIME MESSAGE  DIET NUTRITIONAL SUPPLEMENTS    Pertinent test results:  Ct Head Wo Cont    Result Date: 5/27/2019  EXAM: CT HEAD WO CONT INDICATION: Altered mental status. COMPARISON: None. CONTRAST: None. TECHNIQUE: Unenhanced CT of the head was performed using 5 mm images. Brain and bone windows were generated. CT dose reduction was achieved through use of a standardized protocol tailored for this examination and automatic exposure control for dose modulation.   FINDINGS: The ventricles and sulci are normal in size, shape and configuration and midline. There is no significant white matter disease. There is no intracranial hemorrhage, extra-axial collection, mass, mass effect or midline shift. The basilar cisterns are open. No acute infarct is identified. The bone windows demonstrate no abnormalities. The visualized portions of the paranasal sinuses and mastoid air cells are clear. No acute findings. Ct Head Wo Cont    Result Date: 5/14/2019  EXAM: CT HEAD WO CONT INDICATION: fall COMPARISON: None. CONTRAST: None. TECHNIQUE: Unenhanced CT of the head was performed using 5 mm images. Brain and bone windows were generated. CT dose reduction was achieved through use of a standardized protocol tailored for this examination and automatic exposure control for dose modulation. FINDINGS: There is no extra-axial fluid collection, hemorrhage or shift no masses. Scalp hematoma right frontal convexity. There is atrophy and white matter disease. impression: No acute intracranial findings. Xr Chest Port    Result Date: 5/27/2019  EXAM: XR CHEST PORT INDICATION: wheezing COMPARISON: Chest x-ray 5/14/2019. FINDINGS: A portable AP radiograph of the chest was obtained at 01:03 hours. The patient is on a cardiac monitor. The lungs are clear for technique and degree of rotation. The cardiac and mediastinal contours and pulmonary vascularity are normal.  Atherosclerotic calcifications affect the aortic arch and the thoracic aorta is tortuous. The chest wall structures and visualized upper abdomen show no acute findings with incidental note of degenerative spine and shoulder changes as well as diffuse osteopenia and partial visualization of lower cervical fixation hardware. No acute findings. Xr Chest Port    Result Date: 5/14/2019  Clinical indication: Shortness of breath. Portable AP semierect view of the chest is obtained no prior. Shallow inspiration. Mild cardiomegaly.  Mild increase interstitial markings without focal consolidation. Prior neck surgery. Diffuse DJD and osteopenia. IMPRESSION: Shallow inspiration, Cardiomegaly and mild interstitial prominence. Recent Results (from the past 336 hour(s))   CBC WITH AUTOMATED DIFF    Collection Time: 05/22/19  8:29 PM   Result Value Ref Range    WBC 10.8 4.1 - 11.1 K/uL    RBC 4.60 4.10 - 5.70 M/uL    HGB 13.8 12.1 - 17.0 g/dL    HCT 42.6 36.6 - 50.3 %    MCV 92.6 80.0 - 99.0 FL    MCH 30.0 26.0 - 34.0 PG    MCHC 32.4 30.0 - 36.5 g/dL    RDW 18.3 (H) 11.5 - 14.5 %    PLATELET 982 444 - 355 K/uL    MPV 9.7 8.9 - 12.9 FL    NRBC 0.0 0  WBC    ABSOLUTE NRBC 0.00 0.00 - 0.01 K/uL    NEUTROPHILS 74 32 - 75 %    LYMPHOCYTES 17 12 - 49 %    MONOCYTES 7 5 - 13 %    EOSINOPHILS 2 0 - 7 %    BASOPHILS 0 0 - 1 %    IMMATURE GRANULOCYTES 0 0.0 - 0.5 %    ABS. NEUTROPHILS 8.0 1.8 - 8.0 K/UL    ABS. LYMPHOCYTES 1.8 0.8 - 3.5 K/UL    ABS. MONOCYTES 0.7 0.0 - 1.0 K/UL    ABS. EOSINOPHILS 0.3 0.0 - 0.4 K/UL    ABS. BASOPHILS 0.0 0.0 - 0.1 K/UL    ABS. IMM. GRANS. 0.0 0.00 - 0.04 K/UL    DF AUTOMATED     METABOLIC PANEL, COMPREHENSIVE    Collection Time: 05/22/19  8:29 PM   Result Value Ref Range    Sodium 141 136 - 145 mmol/L    Potassium 3.3 (L) 3.5 - 5.1 mmol/L    Chloride 110 (H) 97 - 108 mmol/L    CO2 25 21 - 32 mmol/L    Anion gap 6 5 - 15 mmol/L    Glucose 113 (H) 65 - 100 mg/dL    BUN 30 (H) 6 - 20 MG/DL    Creatinine 0.92 0.70 - 1.30 MG/DL    BUN/Creatinine ratio 33 (H) 12 - 20      GFR est AA >60 >60 ml/min/1.73m2    GFR est non-AA >60 >60 ml/min/1.73m2    Calcium 9.0 8.5 - 10.1 MG/DL    Bilirubin, total 1.4 (H) 0.2 - 1.0 MG/DL    ALT (SGPT) 12 12 - 78 U/L    AST (SGOT) 12 (L) 15 - 37 U/L    Alk.  phosphatase 74 45 - 117 U/L    Protein, total 7.0 6.4 - 8.2 g/dL    Albumin 2.8 (L) 3.5 - 5.0 g/dL    Globulin 4.2 (H) 2.0 - 4.0 g/dL    A-G Ratio 0.7 (L) 1.1 - 2.2     SAMPLES BEING HELD    Collection Time: 05/22/19  8:29 PM   Result Value Ref Range    SAMPLES BEING HELD 1red,1blue COMMENT        Add-on orders for these samples will be processed based on acceptable specimen integrity and analyte stability, which may vary by analyte. URINALYSIS W/MICROSCOPIC    Collection Time: 05/22/19  9:54 PM   Result Value Ref Range    Color ERICA      Appearance TURBID (A) CLEAR      Specific gravity 1.020 1.003 - 1.030      pH (UA) 8.0 5.0 - 8.0      Protein 100 (A) NEG mg/dL    Glucose NEGATIVE  NEG mg/dL    Ketone 15 (A) NEG mg/dL    Blood LARGE (A) NEG      Urobilinogen 1.0 0.2 - 1.0 EU/dL    Nitrites NEGATIVE  NEG      Leukocyte Esterase LARGE (A) NEG      WBC >100 (H) 0 - 4 /hpf    RBC 10-20 0 - 5 /hpf    Epithelial cells FEW FEW /lpf    Bacteria 2+ (A) NEG /hpf    Amorphous Crystals 3+ (A) NEG   URINE CULTURE HOLD SAMPLE    Collection Time: 05/22/19  9:54 PM   Result Value Ref Range    Urine culture hold        URINE ON HOLD IN MICROBIOLOGY DEPT FOR 3 DAYS. IF UNPRESERVED URINE IS SUBMITTED, IT CANNOT BE USED FOR ADDITIONAL TESTING AFTER 24 HRS, RECOLLECTION WILL BE REQUIRED.    BILIRUBIN, CONFIRM    Collection Time: 05/22/19  9:54 PM   Result Value Ref Range    Bilirubin UA, confirm INDETERMINATE DUE TO COLOR INTERFERENCE (A) NEG     CULTURE, URINE    Collection Time: 05/22/19 11:28 PM   Result Value Ref Range    Special Requests: NO SPECIAL REQUESTS      [place] Count >100,000  COLONIES/mL        Culture result: PROTEUS MIRABILIS 2ND STRAIN (A)      Culture result: PROTEUS MIRABILIS (A)         Susceptibility    Proteus mirabilis - GERARDO     Amikacin ($) <=16 Susceptible ug/mL     Ampicillin ($) <=8 Susceptible ug/mL     Ampicillin/sulbactam ($) <=8/4 Susceptible ug/mL     Aztreonam ($$$$) <=4 Susceptible ug/mL     Cefazolin ($) <=8 Susceptible ug/mL     Cefepime ($$) <=4 Susceptible ug/mL     Cefotaxime <=2 Susceptible ug/mL     Ceftazidime ($) <=1 Susceptible ug/mL     Ceftriaxone ($) <=1 Susceptible ug/mL     Cefuroxime ($) <=4 Susceptible ug/mL     Ciprofloxacin ($) >2 Resistant ug/mL Gentamicin ($) <=4 Susceptible ug/mL     Levofloxacin ($) >4 Resistant ug/mL     Meropenem ($$) <=1 Susceptible ug/mL     Piperacillin/Tazobac ($) <=16 Susceptible ug/mL     Tobramycin ($) <=4 Susceptible ug/mL     Trimeth/Sulfa <=2/38 Susceptible ug/mL    Proteus mirabilis - GERARDO     Amikacin ($) <=16 Susceptible ug/mL     Ampicillin ($) <=8 Susceptible ug/mL     Ampicillin/sulbactam ($) <=8/4 Susceptible ug/mL     Aztreonam ($$$$) <=4 Susceptible ug/mL     Cefazolin ($) <=8 Susceptible ug/mL     Cefepime ($$) <=4 Susceptible ug/mL     Cefotaxime <=2 Susceptible ug/mL     Ceftazidime ($) <=1 Susceptible ug/mL     Ceftriaxone ($) <=1 Susceptible ug/mL     Cefuroxime ($) <=4 Susceptible ug/mL     Ciprofloxacin ($) >2 Resistant ug/mL     Gentamicin ($) <=4 Susceptible ug/mL     Levofloxacin ($) >4 Resistant ug/mL     Meropenem ($$) <=1 Susceptible ug/mL     Piperacillin/Tazobac ($) <=16 Susceptible ug/mL     Tobramycin ($) <=4 Susceptible ug/mL     Trimeth/Sulfa <=2/38 Susceptible ug/mL   POC LACTIC ACID    Collection Time: 05/27/19  1:24 AM   Result Value Ref Range    Lactic Acid (POC) 2.12 (HH) 0.40 - 2.00 mmol/L   CBC WITH AUTOMATED DIFF    Collection Time: 05/27/19  1:27 AM   Result Value Ref Range    WBC 14.8 (H) 4.1 - 11.1 K/uL    RBC 4.18 4.10 - 5.70 M/uL    HGB 12.5 12.1 - 17.0 g/dL    HCT 39.7 36.6 - 50.3 %    MCV 95.0 80.0 - 99.0 FL    MCH 29.9 26.0 - 34.0 PG    MCHC 31.5 30.0 - 36.5 g/dL    RDW 18.7 (H) 11.5 - 14.5 %    PLATELET 867 218 - 145 K/uL    MPV 10.5 8.9 - 12.9 FL    NRBC 0.0 0  WBC    ABSOLUTE NRBC 0.00 0.00 - 0.01 K/uL    NEUTROPHILS 58 32 - 75 %    LYMPHOCYTES 36 12 - 49 %    MONOCYTES 5 5 - 13 %    EOSINOPHILS 1 0 - 7 %    BASOPHILS 0 0 - 1 %    IMMATURE GRANULOCYTES 0 0.0 - 0.5 %    ABS. NEUTROPHILS 8.5 (H) 1.8 - 8.0 K/UL    ABS. LYMPHOCYTES 5.4 (H) 0.8 - 3.5 K/UL    ABS. MONOCYTES 0.7 0.0 - 1.0 K/UL    ABS. EOSINOPHILS 0.2 0.0 - 0.4 K/UL    ABS. BASOPHILS 0.0 0.0 - 0.1 K/UL    ABS. IMM. GRANS. 0.0 0.00 - 0.04 K/UL    DF AUTOMATED     METABOLIC PANEL, COMPREHENSIVE    Collection Time: 05/27/19  1:27 AM   Result Value Ref Range    Sodium 146 (H) 136 - 145 mmol/L    Potassium 3.2 (L) 3.5 - 5.1 mmol/L    Chloride 111 (H) 97 - 108 mmol/L    CO2 27 21 - 32 mmol/L    Anion gap 8 5 - 15 mmol/L    Glucose 117 (H) 65 - 100 mg/dL    BUN 34 (H) 6 - 20 MG/DL    Creatinine 0.89 0.70 - 1.30 MG/DL    BUN/Creatinine ratio 38 (H) 12 - 20      GFR est AA >60 >60 ml/min/1.73m2    GFR est non-AA >60 >60 ml/min/1.73m2    Calcium 8.9 8.5 - 10.1 MG/DL    Bilirubin, total 1.1 (H) 0.2 - 1.0 MG/DL    ALT (SGPT) 32 12 - 78 U/L    AST (SGOT) 35 15 - 37 U/L    Alk. phosphatase 75 45 - 117 U/L    Protein, total 6.8 6.4 - 8.2 g/dL    Albumin 2.7 (L) 3.5 - 5.0 g/dL    Globulin 4.1 (H) 2.0 - 4.0 g/dL    A-G Ratio 0.7 (L) 1.1 - 2.2     SAMPLES BEING HELD    Collection Time: 05/27/19  1:27 AM   Result Value Ref Range    SAMPLES BEING HELD RD,BL     COMMENT        Add-on orders for these samples will be processed based on acceptable specimen integrity and analyte stability, which may vary by analyte.    EKG, 12 LEAD, INITIAL    Collection Time: 05/27/19  1:36 AM   Result Value Ref Range    Ventricular Rate 91 BPM    Atrial Rate 110 BPM    QRS Duration 92 ms    Q-T Interval 312 ms    QTC Calculation (Bezet) 383 ms    Calculated R Axis -51 degrees    Calculated T Axis -122 degrees    Diagnosis       Atrial fibrillation with premature ventricular or aberrantly conducted   complexes  Left axis deviation  Inferior infarct , age undetermined  ST & T wave abnormality, consider lateral ischemia  No previous ECGs available  Confirmed by Elkin Brown M.D., Enrike Burleson ([id number]) on 5/28/2019 5:18:13 PM     CULTURE, BLOOD, PAIRED    Collection Time: 05/27/19  2:50 AM   Result Value Ref Range    Special Requests: NO SPECIAL REQUESTS      Culture result: (A)      MIXED STAPHYLOCOCCUS SPECIES, COAGULASE NEGATIVE GROWING IN 2 OF 4 BOTTLES DRAWN (SITE = RAC) PLATES HELD 3 DAYS. CALL MICROBIOLOGY LAB IF SENSITIVITIES ARE NEEDED. Culture result: (A)       PRELIMINARY REPORT OF GRAM POSITIVE COCCI IN CLUSTERS GROWING IN 1 OF 4 BOTTLES DRAWN CALLED TO AND READ BACK BY  Taras [address] RN [address]) ON 5/27/19 AT 2220.  [facility]    Culture result: REMAINING BOTTLE(S) HAS/HAVE NO GROWTH SO FAR     POC LACTIC ACID    Collection Time: 05/27/19  4:25 AM   Result Value Ref Range    Lactic Acid (POC) 2.42 (HH) 0.40 - 2.00 mmol/L   URINALYSIS W/ REFLEX CULTURE    Collection Time: 05/27/19  6:32 AM   Result Value Ref Range    Color YELLOW      Appearance TURBID (A) CLEAR      Specific gravity 1.028 1.003 - 1.030      pH (UA) 6.0 5.0 - 8.0      Protein 100 (A) NEG mg/dL    Glucose NEGATIVE  NEG mg/dL    Ketone NEGATIVE  NEG mg/dL    Blood LARGE (A) NEG      Urobilinogen 1.0 0.2 - 1.0 EU/dL    Nitrites NEGATIVE  NEG      Leukocyte Esterase LARGE (A) NEG      WBC >100 (H) 0 - 4 /hpf    RBC >100 (H) 0 - 5 /hpf    Epithelial cells FEW FEW /lpf    Bacteria 1+ (A) NEG /hpf    UA:UC IF INDICATED URINE CULTURE ORDERED (A) CNI      Yeast PRESENT (A) NEG     BILIRUBIN, CONFIRM    Collection Time: 05/27/19  6:32 AM   Result Value Ref Range    Bilirubin UA, confirm NEGATIVE  NEG     CULTURE, URINE    Collection Time: 05/27/19  6:32 AM   Result Value Ref Range    Special Requests: NO SPECIAL REQUESTS  Reflexed from [id number]        [place] Count <1,000 CFU/ML      Culture result: NO GROWTH 1 DAY     LACTIC ACID    Collection Time: 05/27/19  7:32 AM   Result Value Ref Range    Lactic acid 2.3 (HH) 0.4 - 2.0 MMOL/L   LACTIC ACID    Collection Time: 05/27/19 12:23 PM   Result Value Ref Range    Lactic acid 1.7 0.4 - 2.0 MMOL/L   GLUCOSE, POC    Collection Time: 05/27/19  4:13 PM   Result Value Ref Range    Glucose (POC) 102 (H) 65 - 100 mg/dL    Performed by Elo BARRIOS(CON)    CULTURE, MRSA    Collection Time: 05/27/19  8:23 PM   Result Value Ref Range    Special Requests: NO SPECIAL REQUESTS      Culture result: MRSA PRESENT (A)      Culture result:            Screening of patient nares for MRSA is for surveillance purposes and, if positive, to facilitate isolation considerations in high risk settings. It is not intended for automatic decolonization interventions per se as regimens are not sufficiently effective to warrant routine use.    METABOLIC PANEL, BASIC    Collection Time: 05/28/19 12:33 AM   Result Value Ref Range    Sodium 149 (H) 136 - 145 mmol/L    Potassium 3.4 (L) 3.5 - 5.1 mmol/L    Chloride 116 (H) 97 - 108 mmol/L    CO2 25 21 - 32 mmol/L    Anion gap 8 5 - 15 mmol/L    Glucose 96 65 - 100 mg/dL    BUN 26 (H) 6 - 20 MG/DL    Creatinine 0.51 (L) 0.70 - 1.30 MG/DL    BUN/Creatinine ratio 51 (H) 12 - 20      GFR est AA >60 >60 ml/min/1.73m2    GFR est non-AA >60 >60 ml/min/1.73m2    Calcium 8.2 (L) 8.5 - 10.1 MG/DL   CBC W/O DIFF    Collection Time: 05/28/19 12:33 AM   Result Value Ref Range    WBC 9.7 4.1 - 11.1 K/uL    RBC 3.87 (L) 4.10 - 5.70 M/uL    HGB 11.5 (L) 12.1 - 17.0 g/dL    HCT 36.8 36.6 - 50.3 %    MCV 95.1 80.0 - 99.0 FL    MCH 29.7 26.0 - 34.0 PG    MCHC 31.3 30.0 - 36.5 g/dL    RDW 18.9 (H) 11.5 - 14.5 %    PLATELET 320 100 - 932 K/uL    MPV 10.3 8.9 - 12.9 FL    NRBC 0.0 0  WBC    ABSOLUTE NRBC 0.00 0.00 - 0.01 K/uL   CULTURE, BLOOD, PAIRED    Collection Time: 05/28/19 12:33 AM   Result Value Ref Range    Special Requests: NO SPECIAL REQUESTS      Culture result: NO GROWTH 3 DAYS     CBC WITH AUTOMATED DIFF    Collection Time: 05/29/19  1:21 AM   Result Value Ref Range    WBC 8.5 4.1 - 11.1 K/uL    RBC 3.89 (L) 4.10 - 5.70 M/uL    HGB 11.7 (L) 12.1 - 17.0 g/dL    HCT 36.7 36.6 - 50.3 %    MCV 94.3 80.0 - 99.0 FL    MCH 30.1 26.0 - 34.0 PG    MCHC 31.9 30.0 - 36.5 g/dL    RDW 18.6 (H) 11.5 - 14.5 %    PLATELET 427 382 - 481 K/uL    MPV 10.3 8.9 - 12.9 FL    NRBC 0.0 0  WBC    ABSOLUTE NRBC 0.00 0.00 - 0.01 K/uL    NEUTROPHILS 63 32 - 75 %    LYMPHOCYTES 24 12 - 49 %    MONOCYTES 7 5 - 13 %    EOSINOPHILS 6 0 - 7 %    BASOPHILS 0 0 - 1 %    IMMATURE GRANULOCYTES 0 0.0 - 0.5 %    ABS. NEUTROPHILS 5.4 1.8 - 8.0 K/UL    ABS. LYMPHOCYTES 2.0 0.8 - 3.5 K/UL    ABS. MONOCYTES 0.6 0.0 - 1.0 K/UL    ABS. EOSINOPHILS 0.5 (H) 0.0 - 0.4 K/UL    ABS. BASOPHILS 0.0 0.0 - 0.1 K/UL    ABS. IMM.  GRANS. 0.0 0.00 - 0.04 K/UL    DF AUTOMATED     METABOLIC PANEL, BASIC    Collection Time: 05/29/19  1:21 AM   Result Value Ref Range    Sodium 147 (H) 136 - 145 mmol/L    Potassium 3.0 (L) 3.5 - 5.1 mmol/L    Chloride 115 (H) 97 - 108 mmol/L    CO2 24 21 - 32 mmol/L    Anion gap 8 5 - 15 mmol/L    Glucose 89 65 - 100 mg/dL    BUN 15 6 - 20 MG/DL    Creatinine 0.44 (L) 0.70 - 1.30 MG/DL    BUN/Creatinine ratio 34 (H) 12 - 20      GFR est AA >60 >60 ml/min/1.73m2    GFR est non-AA >60 >60 ml/min/1.73m2    Calcium 8.3 (L) 8.5 - 10.1 MG/DL   PROCALCITONIN    Collection Time: 05/29/19  1:21 AM   Result Value Ref Range    Procalcitonin 0.1 ng/mL   MAGNESIUM    Collection Time: 05/29/19  1:21 AM   Result Value Ref Range    Magnesium 1.8 1.6 - 2.4 mg/dL   PHOSPHORUS    Collection Time: 05/29/19  1:21 AM   Result Value Ref Range    Phosphorus 1.6 (L) 2.6 - 4.7 MG/DL   EKG, 12 LEAD, INITIAL    Collection Time: 05/29/19  2:02 AM   Result Value Ref Range    Ventricular Rate 76 BPM    Atrial Rate 47 BPM    QRS Duration 82 ms    Q-T Interval 380 ms    QTC Calculation (Bezet) 427 ms    Calculated R Axis -79 degrees    Calculated T Axis -49 degrees    Diagnosis       Atrial fibrillation with premature ventricular or aberrantly conducted   complexes  Left axis deviation  Low voltage QRS  Inferior infarct (cited on or before 27-MAY-2019)  Possible Anterolateral infarct , age undetermined  When compared with ECG of 27-MAY-2019 01:36,  Borderline criteria for Anterolateral infarct are now present  Inverted T waves have replaced nonspecific T wave abnormality in Anterior   leads  T wave inversion less evident in Lateral leads  low voltage is now present  Confirmed by Roxann Patel MD, [facility] ([id number]) on 5/30/2019 8:11:25 AM     CBC WITH AUTOMATED DIFF    Collection Time: 05/30/19  6:20 AM   Result Value Ref Range    WBC 7.1 4.1 - 11.1 K/uL    RBC 3.79 (L) 4.10 - 5.70 M/uL    HGB 11.4 (L) 12.1 - 17.0 g/dL    HCT 35.5 (L) 36.6 - 50.3 %    MCV 93.7 80.0 - 99.0 FL    MCH 30.1 26.0 - 34.0 PG    MCHC 32.1 30.0 - 36.5 g/dL    RDW 18.5 (H) 11.5 - 14.5 %    PLATELET 249 175 - 311 K/uL    MPV 10.0 8.9 - 12.9 FL    NRBC 0.0 0  WBC    ABSOLUTE NRBC 0.00 0.00 - 0.01 K/uL    NEUTROPHILS 53 32 - 75 %    LYMPHOCYTES 33 12 - 49 %    MONOCYTES 7 5 - 13 %    EOSINOPHILS 7 0 - 7 %    BASOPHILS 0 0 - 1 %    IMMATURE GRANULOCYTES 0 0.0 - 0.5 %    ABS. NEUTROPHILS 3.7 1.8 - 8.0 K/UL    ABS. LYMPHOCYTES 2.4 0.8 - 3.5 K/UL    ABS. MONOCYTES 0.5 0.0 - 1.0 K/UL    ABS. EOSINOPHILS 0.5 (H) 0.0 - 0.4 K/UL    ABS. BASOPHILS 0.0 0.0 - 0.1 K/UL    ABS. IMM.  GRANS. 0.0 0.00 - 0.04 K/UL    DF AUTOMATED     METABOLIC PANEL, BASIC    Collection Time: 05/30/19  6:20 AM   Result Value Ref Range    Sodium 145 136 - 145 mmol/L    Potassium 2.9 (L) 3.5 - 5.1 mmol/L    Chloride 113 (H) 97 - 108 mmol/L    CO2 26 21 - 32 mmol/L    Anion gap 6 5 - 15 mmol/L    Glucose 91 65 - 100 mg/dL    BUN 8 6 - 20 MG/DL    Creatinine 0.54 (L) 0.70 - 1.30 MG/DL    BUN/Creatinine ratio 15 12 - 20      GFR est AA >60 >60 ml/min/1.73m2    GFR est non-AA >60 >60 ml/min/1.73m2    Calcium 8.5 8.5 - 10.1 MG/DL   PHOSPHORUS    Collection Time: 05/30/19  6:20 AM   Result Value Ref Range    Phosphorus 2.8 2.6 - 4.7 MG/DL   METABOLIC PANEL, BASIC    Collection Time: 05/31/19  4:09 AM   Result Value Ref Range    Sodium 144 136 - 145 mmol/L    Potassium 3.5 3.5 - 5.1 mmol/L    Chloride 113 (H) 97 - 108 mmol/L    CO2 26 21 - 32 mmol/L    Anion gap 5 5 - 15 mmol/L    Glucose 85 65 - 100 mg/dL    BUN 11 6 - 20 MG/DL    Creatinine 0.50 (L) 0.70 - 1.30 MG/DL    BUN/Creatinine ratio 22 (H) 12 - 20      GFR est AA >60 >60 ml/min/1.73m2    GFR est non-AA >60 >60 ml/min/1.73m2    Calcium 8.2 (L) 8.5 - 10.1 MG/DL           Physical Exam on Discharge:    Discharge condition: guarded    Vital signs:   Patient Vitals for the past 12 hrs:   Temp Pulse Resp BP SpO2   06/01/19 0246 97.4 °F (36.3 °C) 95 19 137/81 93 %       General appearance: fatigued, confused  Lungs: decreased AE at the bases    Current Discharge Medication List      START taking these medications    Details   cefUROXime (CEFTIN) 500 mg tablet Take 1 Tab by mouth every twelve (12) hours for 4 days. Qty: 8 Tab, Refills: 0         CONTINUE these medications which have NOT CHANGED    Details   aspirin delayed-release 81 mg tablet Take 81 mg by mouth daily. lactulose (CONSTULOSE) 10 gram/15 mL solution Take 10 g by mouth three (3) times daily as needed. acetaminophen (TYLENOL) 325 mg tablet Take 650 mg by mouth every four (4) hours as needed for Pain.      levothyroxine (SYNTHROID) 50 mcg tablet Take 50 mcg by mouth Daily (before breakfast). clonazePAM (KLONOPIN) 0.5 mg tablet Take 0.5 mg by mouth nightly. amLODIPine (NORVASC) 10 mg tablet Take 10 mg by mouth daily. donepezil (ARICEPT) 5 mg tablet Take 5 mg by mouth. STOP taking these medications       furosemide (LASIX) 20 mg tablet Comments:   Reason for Stopping:         losartan (COZAAR) 100 mg tablet Comments:   Reason for Stopping:                  Total time spent on discharge planning, counseling and co-ordination of care:   24 minutes    Corinna Cardona MD  06/01/19  7:46 AM

## 2019-06-01 NOTE — PROGRESS NOTES
CM informed of patient discharge today. The patient is here from [facility] (Memory care Unit). He will be returning to this unit today (2nd Floor room 223). The assigned RN to call report to [phone]. CM spoke with Selena Arellano (at the [facility]) to advised of d/c planned for today. Ambulance transport arranged for 2pm transport. Discharge packet placed on hard chart. GELY Soto, CRM    1:44p  CM noted consult for Hospice. CM spoke with patient's daughter and she prefers agency the facility uses. CM called [facility] and  Was told most residents use [facility]. Referral sent via [employer] to [facility]. Agency informed that patient will be transported back to the facility shortly.    2:04p  Patient approved for servicing with [facility]. Updates placed on After Visit Summary.

## 2019-06-01 NOTE — DISCHARGE INSTRUCTIONS
Nutrition Recommendations for Discharge:             Continue Oral Nutrition Supplements at discharge:   Ensure High Protein for Muscle Health; Ensure Compact (received while at [facility]) or similar product  Twice daily for 30 days unless otherwise directed by your Primary Care Physician. This is recommended to help with healing your skin & prevent further break down. This product or similar can be purchased at your local grocery store, pharmacy and/or online. Barron Yoon RD, MS, CDE                                                                              Discharge Instructions       PATIENT ID: Emily Linares  MRN: [id number]   YOB: 1923    DATE OF ADMISSION: 5/27/2019 12:45 AM    DATE OF DISCHARGE: 6/1/2019    PRIMARY CARE PROVIDER: None     ATTENDING PHYSICIAN: Jacqueline Staples MD  DISCHARGING PROVIDER: Rhina Collado MD    To contact this individual call [phone] and ask the  to page. If unavailable ask to be transferred the Adult Hospitalist Department.     DISCHARGE DIAGNOSES   UTI  Acute encephalopathy  Please consult Hospice at the facility  FU with Urology as recommended    CONSULTATIONS: IP CONSULT TO HOSPITALIST  IP CONSULT TO UROLOGY  IP CONSULT TO INFECTIOUS DISEASES  IP CONSULT TO PALLIATIVE CARE - PROVIDER    PROCEDURES/SURGERIES: * No surgery found *    PENDING TEST RESULTS:   At the time of discharge the following test results are still pending: n/a    FOLLOW UP APPOINTMENTS:   Follow-up Information     Follow up With Specialties Details Why Contact Info    Bravo Garnica MD Urology Schedule an appointment as soon as possible for a visit reschedule May 30 appt and schedule next suprapubic tube change [facility]  [phone]      None    None ([phone]) Patient stated that they have no PCP      FU with PCP at the NH   for post-hospitalization follow up, with in 7 days            ADDITIONAL CARE RECOMMENDATIONS:   · It is important that you take the medication exactly as they are prescribed. · Keep your medication in the bottles provided by the pharmacist and keep a list of the medication names, dosages, and times to be taken in your wallet. · Do not take other medications without consulting your doctor. · No drinking alcohol or driving car or operating machinery if you are on narcotic pain medications. Donot take sedating mediations if you are sleepy or confused. · Fall Precautions  · Keep Well Hydrated  · Report to your medical provider if you feel you have  developed allergies to medications  · Follow up with your PCP or Consultant for medication adjustments and refills  · Monitor for signs of fevers,chills,bleeding,chest pain and seek medical attention if you do so. DIET: DIET DYSPHAGIA PUREED (NDD1)     Oral Nutritional Supplements: Ensure Complete or Ensure Plus Twice daily     ACTIVITY: out of bed to chair for meals    WOUND CARE: as per wound team    EQUIPMENT needed: n/a      DISCHARGE MEDICATIONS:   See Medication Reconciliation Form    · It is important that you take the medication exactly as they are prescribed. · Keep your medication in the bottles provided by the pharmacist and keep a list of the medication names, dosages, and times to be taken in your wallet. · Do not take other medications without consulting your doctor. NOTIFY YOUR PHYSICIAN FOR ANY OF THE FOLLOWING:   Fever over 101 degrees for 24 hours. Chest pain, shortness of breath, fever, chills, nausea, vomiting, diarrhea, change in mentation, falling, weakness, bleeding. Severe pain or pain not relieved by medications. Or, any other signs or symptoms that you may have questions about.       DISPOSITION:    Home With:   OT  PT  HH  RN      x SNF/Inpatient Rehab/LTAC    Independent/assisted living    Hospice    Other:         Signed:   Monserrat Pickens MD  6/1/2019  7:44 AM

## 2019-06-02 LAB
BACTERIA SPEC CULT: NORMAL
SERVICE CMNT-IMP: NORMAL

## 2019-12-18 NOTE — H&P
Radiology History and Physical    Patient: Pedro Knapp 80 y.o. male       Chief Complaint: No chief complaint on file. History of Present Illness: for sp tube    History:    Past Medical History:   Diagnosis Date    A-fib ([facility] [address]     Hypertension     Spondylosis     Thyroid disease     TIA (transient ischemic attack)     Urinary retention      No family history on file.   Social History     Socioeconomic History    Marital status:      Spouse name: Not on file    Number of children: Not on file    Years of education: Not on file    Highest education level: Not on file   Occupational History    Not on file   Social Needs    Financial resource strain: Not on file    Food insecurity:     Worry: Not on file     Inability: Not on file    Transportation needs:     Medical: Not on file     Non-medical: Not on file   Tobacco Use    Smoking status: Never Smoker    Smokeless tobacco: Never Used   Substance and Sexual Activity    Alcohol use: No     Frequency: Never    Drug use: Not on file    Sexual activity: Not on file   Lifestyle    Physical activity:     Days per week: Not on file     Minutes per session: Not on file    Stress: Not on file   Relationships    Social connections:     Talks on phone: Not on file     Gets together: Not on file     Attends Judaism service: Not on file     Active member of club or organization: Not on file     Attends meetings of clubs or organizations: Not on file     Relationship status: Not on file    Intimate partner violence:     Fear of current or ex partner: Not on file     Emotionally abused: Not on file     Physically abused: Not on file     Forced sexual activity: Not on file   Other Topics Concern    Not on file   Social History Narrative    Not on file       Allergies: No Known Allergies    Current Medications:  Current Facility-Administered Medications   Medication Dose Route Frequency    lidocaine (XYLOCAINE) 2 % jelly   Mucous Membrane PRN  lidocaine (PF) (XYLOCAINE) 10 mg/mL (1 %) injection            Physical Exam:  Blood pressure 125/71, pulse 71, temperature 97.8 °F (36.6 °C), resp. rate 12, height 6' 2\" (1.88 m), weight 85.7 kg (189 lb), SpO2 97 %. LUNG: clear to auscultation bilaterally, HEART: regular rate and rhythm      Alerts:    Hospital Problems  Date Reviewed: 2/26/2019    None          Laboratory:    No results for input(s): HGB, HCT, WBC, PLT, INR, BUN, CREA, K, CRCLT, HGBEXT, HCTEXT, PLTEXT in the last 72 hours. No lab exists for component: PTT, PT, INREXT      Plan of Care/Planned Procedure:  Risks, benefits, and alternatives reviewed with patient and he agrees to proceed with the procedure.        Minnie Tejeda MD normal

## 2021-09-24 NOTE — ED NOTES
Pt/family member given discharge instructions, patient education and follow-up information. Pt/family member states understanding - all questions answered. Pt discharged to [facility] by [facility] transportation. Treatment 1: tretinoin 0.05% cream

## 2022-03-08 NOTE — CONSULTS
Palliative Medicine Consult  [place]: [phone] ([phone])    Patient Name: Ros Fuller  YOB: 1923    Date of Initial Consult: 19  Reason for Consult: Care decisions  Requesting Provider: Dr. Khang Ruiz  Primary Care Physician: None     SUMMARY:   Ros Fuller is a 80 y. o. with a past history of dementia, HTN, Right AKA, hypothyroidism and urinary retention s/p suprapubic catheter, who was admitted on 2019 from the ED with concerns of sepsis. He was sent to the ED form his NH initially due to a clogged suprapubic catheter, however documentation confirms it was patent upon presentation. Hospitalization has been complicated by delirium. Current medical issues leading to Palliative Medicine involvement include: advanced care planning for a patient with moderate-severe dementia, recurrent ED presentations for catheter issues and fall (, , ). Social Hx:  Pt is , they have three sons Katherine Padilla and Nate and a daughter Michelle Sheppard. Son Nate is a pathologist in [place] and serves as patient's mPOA according to verbal report. PALLIATIVE DIAGNOSES:   1. Delirium  2. Dementia  3. Debility  4. Suprapubic catheter with pyruria     PLAN:   1. Mr. Jovan Chaudhry is more lethargic today; I was not able to rouse him this afternoon prior to family mtg.     2. Family Mt. Met with dtr Michelle Sheppard, and sons Nate and Dave by phone. 2. AMD not yet produced, but pt's wife has a copy at the [facility]. They all state son Dr. Ting Stephenson is mPOA. 3. They shared that their Father was diagnosed with dementia just within the past year, suspect symptoms present prior but parents are private people and preferred not to discuss these things with doctors. His dementia has progressed significantly since his diagnosis and he was moved into the memory care unit at the [facility] approximately 2 months ago.   He is s/p right AKA and could previously transfer, but the past few months has [facility], [address], [address], [address] [place] [address]  P: [phone]       Patient     Geraldine Shaw  1958    Chief Concern     Chief Complaint   Patient presents with    Thyroid Problem     RT thyroid nodule. Found when doing a CT scan for another reason. Had [address] of thyrid completed 02/22/2022. Assessment and Plan      Diagnosis Orders   1. Multinodular thyroid  Cytology, Non-Gyn    Cytology, Non-Gyn   2. Cough in adult     3. Uses hearing aid     4. Impacted cerumen, right ear       Patient with bilateral thyroid nodules, normal TSH 10/2010 - both nodules biopsied under ultrasound guidance in clinic today. Will call patient back with results. She also had impacted cerumen that was removed with improvement in hearing  (uses BTE hearing aids). Finally, she has intermittent dry cough - uncertain if postnasal drip, reflux, cough variant asthma etc. - will have her start using flonase BID (previously prescribed but not using) and see her back in 6 weeks. No follow-ups on file. History of Present Illness     Patient referred for bilateral thyroid nodules - right 2.9mm, left 11mm, both TI-RADS category 4. Has history of MR/DD, wears BTE hearing aids - history difficult however history of palpitations and nausea (see ED visit 01/2020) - last TSH 10/2010, currently follows up with Dr. Edison Aguiar at [address] [facility]. Caretaker states concern for intermittent cough - dry in nature, ongoing for approximately 6 months however no dysphagia, coughing/choking with foods. On flonase however not taking.      Past Medical History     Past Medical History:   Diagnosis Date    Allergic rhinitis     Diabetes mellitus ([address]     Hypercholesteremia     Mental retardation     No history of procedure 2/29/16    no past colonoscopy       Past Surgical History     Past Surgical History:   Procedure Laterality Date    ANKLE SURGERY      COLONOSCOPY  01/10/2012    TUBAL LIGATION      UPPER GASTROINTESTINAL ENDOSCOPY  2/29/16       Family History     No family history on file. Social History     Social History     Tobacco Use    Smoking status: Never Smoker    Smokeless tobacco: Never Used   Vaping Use    Vaping Use: Never used   Substance Use Topics    Alcohol use: No    Drug use: No        Allergies     No Known Allergies    Medications     Current Outpatient Medications   Medication Sig Dispense Refill    lidocaine (LIDODERM) 5 % Place 1 patch onto the skin daily 12 hours on, 12 hours off. 30 patch 0    ARIPiprazole (ABILIFY) 5 MG tablet       benztropine (COGENTIN) 1 MG tablet       aspirin 81 MG EC tablet Take 81 mg by mouth daily      esomeprazole (NEXIUM) 40 MG delayed release capsule Take 40 mg by mouth every morning (before breakfast)      fluticasone (FLONASE) 50 MCG/ACT nasal spray 1 spray by Each Nostril route daily      nystatin (MYCOSTATIN) 474227 UNIT/GM cream Apply topically Three times per week      montelukast (SINGULAIR) 10 MG tablet Take 10 mg by mouth nightly      ciclopirox (LOPROX) 0.77 % cream Apply topically 2 times daily Apply topically 2 times daily.  ammonium lactate (AMLACTIN) 12 % cream Apply topically 2 times daily Apply twice daily to feet.  metFORMIN (GLUCOPHAGE) 500 MG tablet Take 500 mg by mouth daily 2 tablets daily      nabumetone (RELAFEN) 500 MG tablet Take 750 mg by mouth daily       propylthiouracil (PTU) 50 MG tablet Take 50 mg by mouth 3 times daily       atorvastatin (LIPITOR) 20 MG tablet Take 20 mg by mouth daily.  fexofenadine (ALLEGRA) 180 MG tablet Take 180 mg by mouth daily.  fluoxetine (PROZAC) 20 MG capsule Take 40 mg by mouth daily.  thioridazine (MELLARIL) 25 MG tablet Take 25 mg by mouth Daily. Indications: HS      sucralfate (CARAFATE) 1 GM/10ML suspension Take 10 mLs by mouth 4 times daily for 7 days. 280 mL 0     No current facility-administered medications for this visit.        Review of Systems been 100% bedbound. They report he has good days and bad days, which has made these changes difficult. Just last week he attended a civil war conference and was asking good questions. When alert, they share he eats very well. They admit he has had several days over the past month however where he was too sleepy to eat or drink and certainly exhibited worsening confusion. 4. Mr. Edelmira Whitehead and his wife have been  over 79 years, and therefore the most difficult part of his decline is that they are now , as she resides in assisted side at the [facility]. 5. They raised concern about the suprapubic catheter, hopeful that staff at the [facility] will be able to change it on their own without hospital transfer. 6. We talked about the anticipated decline with advanced dementia, and how often with each setback we are unable to get back to previous baseline. I anticipate readmission for issues such as recurrent UTI / sepsis, pneumonia, pressure ulcerations or dehydration. They acknowledged that frequent transport to the hospital and the delirium that ensues makes things even more difficult. 7. Darrius Lema shared that her Father never wanted to live like this. It is distressing to see him in this state and know that he could die without being near his wife. 8. I introduced the role of Hospice in minimizing care transitions, and supporting one at the end of life to die at home if desired - home in this case the [place]. Reviewed Hospice core services and role of supporting staff at the [facility] as well as the family. 9. They are considering Hospice, and know to reach out to me tomorrow if they are interested in moving forward, otherwise they know to contact the staff at the [facility] to discuss further. 3. Communicated plan of care with: Palliative [facility] [address] Team     GOALS OF CARE / TREATMENT PREFERENCES:     GOALS OF CARE:  Patient/Health Care Proxy Stated Goals:  Other (comment)(pending discussion)    TREATMENT PREFERENCES:   Code Status: DNR    Advance Care Planning:  [x] The [facility] Interdisciplinary Team has updated the ACP Navigator with Health Care Decision Maker and Patient Capacity    Awaiting AMD to be sent to provider. Verbally son Dr. Conley Kawasaki is mPOA [phone]    Medical Interventions: Other (comment)(pending discussion)     Other Instructions: *        Other:    As far as possible, the palliative care team has discussed with patient / health care proxy about goals of care / treatment preferences for patient. HISTORY:     History obtained from:   Chart review, son     CHIEF COMPLAINT: pt unable to report    HPI/SUBJECTIVE:    The patient is:   [] Verbal and participatory  [x] Non-participatory due to:  Dementia, delirium      Clinical Pain Assessment (nonverbal scale for severity on nonverbal patients):   Clinical Pain Assessment  Severity: 0     Activity (Movement): Lying quietly, normal position    Duration: for how long has pt been experiencing pain (e.g., 2 days, 1 month, years)  Frequency: how often pain is an issue (e.g., several times per day, once every few days, constant)     FUNCTIONAL ASSESSMENT:     Palliative Performance Scale (PPS):  PPS: 40(30-40) ---> varies by day. Today it is 20%. PSYCHOSOCIAL/SPIRITUAL SCREENING:     Palliative IDT has assessed this patient for cultural preferences / practices and a referral made as appropriate to needs (Cultural Services, Patient Advocacy, Ethics, etc.)    Any spiritual / Faith concerns:  [] Yes /  [x] No    Caregiver Burnout:  [] Yes /  [x] No /  [x] No Caregiver Present      Anticipatory grief assessment:   [x] Normal  / [] Maladaptive       ESAS Anxiety: Anxiety: 5    ESAS Depression:          REVIEW OF SYSTEMS:     Positive and pertinent negative findings in ROS are noted above in HPI.   The following systems were [x] reviewed / [] unable to be reviewed as noted in HPI  Other findings Review of Systems   Constitutional: Negative for activity change, chills, diaphoresis, fatigue and fever. HENT: Positive for hearing loss. Negative for congestion. Eyes: Negative for visual disturbance. Respiratory: Positive for cough. Negative for shortness of breath and wheezing. Cardiovascular: Negative for chest pain. Gastrointestinal: Negative for abdominal pain. Musculoskeletal: Negative for neck pain and neck stiffness. Skin: Negative for rash. Neurological: Negative for dizziness, light-headedness and headaches. Hematological: Negative for adenopathy. PhysicalExam     Vitals:    03/08/22 1050   BP: 109/60   Site: Left Upper Arm   Position: Sitting   Cuff Size: Medium Adult   Pulse: 79   Temp: 97.8 °F (36.6 °C)   TempSrc: Infrared   Weight: 180 lb (81.6 kg)   Height: 5' 3\" (1.6 m)       Physical Exam  Vitals reviewed. Constitutional:       Appearance: Normal appearance. HENT:      Head: Normocephalic and atraumatic. Right Ear: Tympanic membrane, ear canal and external ear normal. There is impacted cerumen. Left Ear: Tympanic membrane, ear canal and external ear normal. There is no impacted cerumen. Ears:      [place] exam findings: does not lateralize. Right Rinne: AC > BC. Left Rinne: AC > BC. Nose: No congestion or rhinorrhea. Mouth/Throat:      Lips: Pink. No lesions. Mouth: Mucous membranes are moist. No oral lesions. Tongue: No lesions. Tongue does not deviate from midline. Palate: No mass and lesions. Pharynx: Oropharynx is clear. Uvula midline. No oropharyngeal exudate or posterior oropharyngeal erythema. Eyes:      Extraocular Movements: Extraocular movements intact. Pupils: Pupils are equal, round, and reactive to light. Musculoskeletal:      Cervical back: Normal range of motion and neck supple. Lymphadenopathy:      Cervical: No cervical adenopathy. Neurological:      Mental Status: She is alert. Cranial Nerves: No cranial nerve deficit. Data Review        Procedure     Cerumen Debridement    Pre op: Cerumen impaction of the Right ears. Post op: Normal ear examination   Procedure : Binocular otomicroscopy with debridement of cerumen  Surgeon: ANGY Smith  Estimated Blood Loss: None    Procedure:   Binocular otoscopy with debridement of cerumen impaction    After obtaining consent, the patient was placed in the examination chair in the reclined position.      -Right ear: External auditory canal with mild stenosis, canal skin with occluding cerumen, removed with suction and wax curette. Right tympanic membrane visible without without significant retractions or cholesteatoma identified, no middle ear effusions. * Patient tolerated the procedure well with no complications    Neck Lesion Fine Needle Aspiration      Pre op: Bilateral thyroid nodules  Post op: Same  Procedure: Fine needle aspiration of right and left thyroid nodules   Surgeon: ANGY Smith  Anesthesia: 1% lidocaine with epinephrine 1:100,000; 2cc used  Estimated Blood Loss: Minimal     The patient was placed in the examination chair and laid in supine position. Risks and benefits of the procedure including pain, infection, inflammation, nondiagnostic results, hematoma were outlined. Local anesthesia was infiltrated over the site with blanching appreciated  A  #25g needle was used to make multiple passes into the right thyroid nodule under ultrasound guidance with aspiration to obtain cellular content. This was placed on slides for histopathology and into cytolyte for cytology. A final pass was made for Affirma testing and placed into the appropriate collection tube. Attention was then turned to the left side    A  #25g needle was used to make multiple passes into the left thyroid nodule under ultrasound guidance with aspiration to obtain cellular content. This was placed on slides for histopathology and into cytolyte for cytology.  A final are noted below. Systems: constitutional, ears/nose/mouth/throat, respiratory, gastrointestinal, genitourinary, musculoskeletal, integumentary, neurologic, psychiatric, endocrine. Positive findings noted below. Modified ESAS Completed by: provider           Pain: 0   Anxiety: 5                            PHYSICAL EXAM:     From RN flowsheet:  Wt Readings from Last 3 Encounters:   05/30/19 83.1 kg (183 lb 3.2 oz)   05/22/19 82.6 kg (182 lb)   05/04/19 82.6 kg (182 lb 1.6 oz)     Blood pressure 114/67, pulse 76, temperature 97.9 °F (36.6 °C), resp. rate 16, height 6' (1.829 m), weight 83.1 kg (183 lb 3.2 oz), SpO2 97 %. Pain Scale 1: Numeric (0 - 10)  Pain Intensity 1: 0                 Last bowel movement, if known:     Elderly  male supine in bed, NAD  Resp unlabored  Abd soft, no facial grimacing or vocalization with palpation  Right AKA  Lethargic today. Does not rouse from sleep to voice or light touch. HISTORY:     Principal Problem:    Dehydration (5/27/2019)    Active Problems:    Sepsis ([address] (5/27/2019)      Urinary retention (5/27/2019)      Hypokalemia (5/27/2019)      Acute cystitis without hematuria (5/27/2019)      Alzheimer's dementia (5/27/2019)      Past Medical History:   Diagnosis Date    A-fib ([address]     Hypertension     Spondylosis     Thyroid disease     TIA (transient ischemic attack)     Urinary retention       Past Surgical History:   Procedure Laterality Date    HX PROSTATECTOMY  1994    HX SPLENECTOMY      IR ASP BLADDER SUPRA CATH  4/10/2019      History reviewed. No pertinent family history. History reviewed, no pertinent family history.   Social History     Tobacco Use    Smoking status: Never Smoker    Smokeless tobacco: Never Used   Substance Use Topics    Alcohol use: No     Frequency: Never     Allergies   Allergen Reactions    Codeine Other (comments)     vomiting       Current Facility-Administered Medications   Medication Dose Route Frequency    pass was made for Affirma testing and placed into the appropriate collection tube. * Patient tolerated the procedure well with no complications  * Patient was instructed that they may notice minor bleeding or further inflammation of the site    Gladis Sandoval MD  3/8/22    Portions of this note were dictated using Dragon.  There may be linguistic errors secondary to the use of this program. potassium chloride 10 mEq in 50 ml IVPB  10 mEq IntraVENous Q1H    clonazePAM (KlonoPIN) tablet 0.5 mg  0.5 mg Oral QHS PRN    phosphorus (K PHOS NEUTRAL) 250 mg tablet 1 Tab  1 Tab Oral BID    balsam peru-castor oil (VENELEX) ointment   Topical Q8H    donepezil (ARICEPT) tablet 5 mg  5 mg Oral DAILY    sodium chloride (NS) flush 5-40 mL  5-40 mL IntraVENous Q8H    sodium chloride (NS) flush 5-40 mL  5-40 mL IntraVENous PRN    acetaminophen (TYLENOL) tablet 650 mg  650 mg Oral Q4H PRN    cefTRIAXone (ROCEPHIN) 1 g in 0.9% sodium chloride (MBP/ADV) 50 mL  1 g IntraVENous Q24H    amLODIPine (NORVASC) tablet 10 mg  10 mg Oral DAILY    aspirin delayed-release tablet 81 mg  81 mg Oral DAILY    lactulose (CHRONULAC) 10 gram/15 mL solution 15 mL  10 g Oral TID PRN    levothyroxine (SYNTHROID) tablet 50 mcg  50 mcg Oral ACB    ondansetron (ZOFRAN) injection 4 mg  4 mg IntraVENous Q6H PRN    heparin (porcine) injection 5,000 Units  5,000 Units SubCUTAneous BID          LAB AND IMAGING FINDINGS:     Lab Results   Component Value Date/Time    WBC 7.1 05/30/2019 06:20 AM    HGB 11.4 (L) 05/30/2019 06:20 AM    PLATELET 449 74/20/3363 06:20 AM     Lab Results   Component Value Date/Time    Sodium 145 05/30/2019 06:20 AM    Potassium 2.9 (L) 05/30/2019 06:20 AM    Chloride 113 (H) 05/30/2019 06:20 AM    CO2 26 05/30/2019 06:20 AM    BUN 8 05/30/2019 06:20 AM    Creatinine 0.54 (L) 05/30/2019 06:20 AM    Calcium 8.5 05/30/2019 06:20 AM    Magnesium 1.8 05/29/2019 01:21 AM    Phosphorus 2.8 05/30/2019 06:20 AM      Lab Results   Component Value Date/Time    AST (SGOT) 35 05/27/2019 01:27 AM    Alk.  phosphatase 75 05/27/2019 01:27 AM    Protein, total 6.8 05/27/2019 01:27 AM    Albumin 2.7 (L) 05/27/2019 01:27 AM    Globulin 4.1 (H) 05/27/2019 01:27 AM     No results found for: INR, PTMR, PTP, PT1, PT2, APTT   No results found for: IRON, FE, TIBC, IBCT, PSAT, FERR   No results found for: PH, PCO2, PO2  No components found for: [place]   No results found for: CPK, CKMB             Total time: 60m  Counseling / coordination time, spent as noted above: 60m   > 50% counseling / coordination?: y    Prolonged service was provided for  []30 min   []75 min in face to face time in the presence of the patient, spent as noted above. Time Start:   Time End:   Note: this can only be billed with 91182 (initial) or 37618 (follow up). If multiple start / stop times, list each separately.

## 2024-08-28 NOTE — ED TRIAGE NOTES
Pt arrives via EMS from the [facility]) for c/o unwitnessed GLF. EMS reports pt fell out of wheelchair going into bathroom. EMS reports facility is stating there is \"trauma\" to suprapubic catheter but EMS reports the bloody urine does not look new. +hitting head. Denies LOC. Lacerations noted to scalp with bleeding controlled. +blood thinner (Xarelto) Reason for Disposition  • Patient sounds very sick or weak to the triager    Answer Assessment - Initial Assessment Questions  1. SYMPTOM: \"What is the main symptom you are concerned about?\" (e.g., weakness, numbness)      Low back pain with radiation to left leg numbness up to groin and now groin tingling.  2. ONSET: \"When did this start?\" (minutes, hours, days; while sleeping)      Ongoing from 8/23/24 but progressing quickly since 8/27/24.  3. LAST NORMAL: \"When was the last time you (the patient) were normal (no symptoms)?\"      Ongoing  4. PATTERN \"Does this come and go, or has it been constant since it started?\"  \"Is it present now?\"      Constant  5. CARDIAC SYMPTOMS: \"Have you had any of the following symptoms: chest pain, difficulty breathing, palpitations?\"      Denied  6. NEUROLOGIC SYMPTOMS: \"Have you had any of the following symptoms: headache, dizziness, vision loss, double vision, changes in speech, unsteady on your feet?\"      60-70% numbness left leg that has progressed to tingling in the groin area.  7. OTHER SYMPTOMS: \"Do you have any other symptoms?\"      Denied  8. PREGNANCY: \"Is there any chance you are pregnant?\" \"When was your last menstrual period?\"      Denied    Protocols used: Neurologic Deficit-A-OH